# Patient Record
Sex: FEMALE | Race: BLACK OR AFRICAN AMERICAN | NOT HISPANIC OR LATINO | Employment: FULL TIME | URBAN - METROPOLITAN AREA
[De-identification: names, ages, dates, MRNs, and addresses within clinical notes are randomized per-mention and may not be internally consistent; named-entity substitution may affect disease eponyms.]

---

## 2017-07-28 ENCOUNTER — CLINICAL SUPPORT (OUTPATIENT)
Dept: OCCUPATIONAL MEDICINE | Facility: CLINIC | Age: 57
End: 2017-07-28

## 2017-07-28 DIAGNOSIS — Z00.00 PHYSICAL EXAM: Primary | ICD-10-CM

## 2017-07-28 LAB
BILIRUB UR QL STRIP: NORMAL
GLUCOSE UR QL STRIP: NORMAL
KETONES UR QL STRIP: NORMAL
LEUKOCYTE ESTERASE UR QL STRIP: NORMAL
PH, POC UA: NORMAL (ref 5–8)
POC BLOOD, URINE: NORMAL
POC NITRATES, URINE: NORMAL
PROT UR QL STRIP: NORMAL
SP GR UR STRIP: NORMAL (ref 1–1.03)
UROBILINOGEN UR STRIP-ACNC: NORMAL (ref 0.1–1.1)

## 2017-07-28 PROCEDURE — 99499 UNLISTED E&M SERVICE: CPT | Mod: S$GLB,,, | Performed by: NURSE PRACTITIONER

## 2018-03-29 ENCOUNTER — TELEPHONE (OUTPATIENT)
Dept: FAMILY MEDICINE | Facility: CLINIC | Age: 58
End: 2018-03-29

## 2018-03-29 NOTE — TELEPHONE ENCOUNTER
----- Message from Myranda Crowe sent at 3/28/2018 11:02 AM CDT -----  Contact: self, [phone]  New patient requests to schedule an appointment. Please advise.

## 2018-04-16 ENCOUNTER — LAB VISIT (OUTPATIENT)
Dept: LAB | Facility: HOSPITAL | Age: 58
End: 2018-04-16
Attending: INTERNAL MEDICINE
Payer: COMMERCIAL

## 2018-04-16 ENCOUNTER — OFFICE VISIT (OUTPATIENT)
Dept: INTERNAL MEDICINE | Facility: CLINIC | Age: 58
End: 2018-04-16
Payer: COMMERCIAL

## 2018-04-16 VITALS
SYSTOLIC BLOOD PRESSURE: 130 MMHG | HEART RATE: 58 BPM | HEIGHT: 69 IN | WEIGHT: 206.56 LBS | BODY MASS INDEX: 30.59 KG/M2 | DIASTOLIC BLOOD PRESSURE: 80 MMHG

## 2018-04-16 DIAGNOSIS — Z12.4 CERVICAL CANCER SCREENING: ICD-10-CM

## 2018-04-16 DIAGNOSIS — Z12.11 SCREEN FOR COLON CANCER: ICD-10-CM

## 2018-04-16 DIAGNOSIS — Z00.00 PREVENTATIVE HEALTH CARE: Primary | ICD-10-CM

## 2018-04-16 DIAGNOSIS — Z11.59 NEED FOR HEPATITIS C SCREENING TEST: ICD-10-CM

## 2018-04-16 DIAGNOSIS — Z00.00 PREVENTATIVE HEALTH CARE: ICD-10-CM

## 2018-04-16 DIAGNOSIS — Z12.39 BREAST CANCER SCREENING: ICD-10-CM

## 2018-04-16 LAB
25(OH)D3+25(OH)D2 SERPL-MCNC: 17 NG/ML
ALBUMIN SERPL BCP-MCNC: 3.9 G/DL
ALP SERPL-CCNC: 93 U/L
ALT SERPL W/O P-5'-P-CCNC: 18 U/L
ANION GAP SERPL CALC-SCNC: 9 MMOL/L
AST SERPL-CCNC: 24 U/L
BASOPHILS # BLD AUTO: 0.03 K/UL
BASOPHILS NFR BLD: 0.6 %
BILIRUB SERPL-MCNC: 0.4 MG/DL
BUN SERPL-MCNC: 6 MG/DL
CALCIUM SERPL-MCNC: 9.8 MG/DL
CHLORIDE SERPL-SCNC: 106 MMOL/L
CHOLEST SERPL-MCNC: 216 MG/DL
CHOLEST/HDLC SERPL: 4.8 {RATIO}
CO2 SERPL-SCNC: 26 MMOL/L
CREAT SERPL-MCNC: 0.8 MG/DL
DIFFERENTIAL METHOD: NORMAL
EOSINOPHIL # BLD AUTO: 0.1 K/UL
EOSINOPHIL NFR BLD: 1.6 %
ERYTHROCYTE [DISTWIDTH] IN BLOOD BY AUTOMATED COUNT: 12.3 %
EST. GFR  (AFRICAN AMERICAN): >60 ML/MIN/1.73 M^2
EST. GFR  (NON AFRICAN AMERICAN): >60 ML/MIN/1.73 M^2
GLUCOSE SERPL-MCNC: 82 MG/DL
HCT VFR BLD AUTO: 39.1 %
HDLC SERPL-MCNC: 45 MG/DL
HDLC SERPL: 20.8 %
HGB BLD-MCNC: 13.2 G/DL
IMM GRANULOCYTES # BLD AUTO: 0.01 K/UL
IMM GRANULOCYTES NFR BLD AUTO: 0.2 %
LDLC SERPL CALC-MCNC: 144.8 MG/DL
LYMPHOCYTES # BLD AUTO: 1.5 K/UL
LYMPHOCYTES NFR BLD: 31.2 %
MCH RBC QN AUTO: 30.3 PG
MCHC RBC AUTO-ENTMCNC: 33.8 G/DL
MCV RBC AUTO: 90 FL
MONOCYTES # BLD AUTO: 0.3 K/UL
MONOCYTES NFR BLD: 6.6 %
NEUTROPHILS # BLD AUTO: 2.9 K/UL
NEUTROPHILS NFR BLD: 59.8 %
NONHDLC SERPL-MCNC: 171 MG/DL
NRBC BLD-RTO: 0 /100 WBC
PLATELET # BLD AUTO: 318 K/UL
PMV BLD AUTO: 10.3 FL
POTASSIUM SERPL-SCNC: 3.7 MMOL/L
PROT SERPL-MCNC: 8.6 G/DL
RBC # BLD AUTO: 4.36 M/UL
SODIUM SERPL-SCNC: 141 MMOL/L
T4 FREE SERPL-MCNC: 1.15 NG/DL
TRIGL SERPL-MCNC: 131 MG/DL
TSH SERPL DL<=0.005 MIU/L-ACNC: 5.16 UIU/ML
WBC # BLD AUTO: 4.87 K/UL

## 2018-04-16 PROCEDURE — 84443 ASSAY THYROID STIM HORMONE: CPT

## 2018-04-16 PROCEDURE — 82306 VITAMIN D 25 HYDROXY: CPT

## 2018-04-16 PROCEDURE — 85025 COMPLETE CBC W/AUTO DIFF WBC: CPT

## 2018-04-16 PROCEDURE — 86803 HEPATITIS C AB TEST: CPT

## 2018-04-16 PROCEDURE — 99386 PREV VISIT NEW AGE 40-64: CPT | Mod: S$GLB,,, | Performed by: INTERNAL MEDICINE

## 2018-04-16 PROCEDURE — 84439 ASSAY OF FREE THYROXINE: CPT

## 2018-04-16 PROCEDURE — 80061 LIPID PANEL: CPT

## 2018-04-16 PROCEDURE — 99999 PR PBB SHADOW E&M-EST. PATIENT-LVL IV: CPT | Mod: PBBFAC,,, | Performed by: INTERNAL MEDICINE

## 2018-04-16 PROCEDURE — 87624 HPV HI-RISK TYP POOLED RSLT: CPT

## 2018-04-16 PROCEDURE — 36415 COLL VENOUS BLD VENIPUNCTURE: CPT | Mod: PO

## 2018-04-16 PROCEDURE — 88175 CYTOPATH C/V AUTO FLUID REDO: CPT

## 2018-04-16 PROCEDURE — 80053 COMPREHEN METABOLIC PANEL: CPT

## 2018-04-16 NOTE — PROGRESS NOTES
Subjective:       Patient ID: Aminta Barajas is a 58 y.o. female.    Chief Complaint: Annual Exam    HPI 58-year-old female presents to clinic today for annual physical exam patient is here to get established she is a new patient.  Patient has not seen a physician in more than 2 years.  She states she's been in good health and was previously followed by her OB/GYN.  She states it's been a long time since she had a female wellness exam and is willing to proceed in all of this today.  Denies any menses in the last 4 years.  Patient reports that her blood pressure machine at home as been measuring her blood pressure typically in the /60 to 70s range.  She thought that it is may be running on the low side..  Patient jogs and exercises regularly at least 3-4 times per week.  Patient has no history of hypertension diabetes or dyslipidemia.  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  She is  to her patient Mr. Barajas, she has 5 children ages 18-39.  Former history of being in the .  Also as a school athlete.  Denies tobacco or alcohol use  Review of Systems  otherwise negative  Objective:     vital signs reviewed.  Patient's home monitor definitely not accurate registering more than 40 points systolic difference from manual and office blood pressure  Physical Exam  General: Well-appearing, well-nourished.  No distress  HEENT: conjunctivae are normal.  Pupils are equal and reative to light.  TM's are clear and intact bilaterally.  Hearing is grossly normal.  Nasopharynx is clear.  Oropharynx is clear.  Neck: Supple.  No thyroid megaly.  No bruits.  Lymph: No cervical or supraclavicular adenopathy.  Heart: Regular rate and rhythm, without murmur, rub or gallop.  Lungs: Clear to auscultation; respiratory effort  normal.  Abdomen: Soft, nontender, nondistended.  Normoactive bowel sounds.  No hepatomegaly.  No masses.  Extremities: Good distal pulses.  No edema.  Musculoskeletal: 5/5 Strength bilateral upper and lower extremities; free range of motion.  Neuro: No focal deficits.  DTR's are 2+ and equal throughout.  Sensation intact. Normal gait.  Skin: No lesions seen.  Psychiatric: Oriented to time, person, place.  Judgment and insight seem unimpaired.  Pelvic: External genitalia are normal.  Cervix is normal.  Bimanual exam revealed no masses.  Breasts: No masses, discharge, or tenderness.    Assessment:       1. Preventative health care    2. Need for hepatitis C screening test    3. Breast cancer screening    4. Cervical cancer screening    5. Screen for colon cancer        Plan:       Aminat was seen today for annual exam.    Diagnoses and all orders for this visit:    Preventative health care  -     CBC auto differential; Future  -     Comprehensive metabolic panel; Future  -     Lipid panel; Future  -     TSH; Future  -     Vitamin D; Future  -     URINALYSIS    Need for hepatitis C screening test  -     Hepatitis C antibody; Future    Breast cancer screening  -     Mammo Digital Screening Bilat with CAD; Future    Cervical cancer screening  -     Liquid-based pap smear, screening  -     HPV High Risk Genotypes, PCR    Screen for colon cancer  -     Case request GI: COLONOSCOPY

## 2018-04-17 ENCOUNTER — PATIENT MESSAGE (OUTPATIENT)
Dept: INTERNAL MEDICINE | Facility: CLINIC | Age: 58
End: 2018-04-17

## 2018-04-17 LAB — HCV AB SERPL QL IA: NEGATIVE

## 2018-04-19 LAB
HPV16 AG SPEC QL: NEGATIVE
HPV16+18+H RISK 12 DNA CVX-IMP: NEGATIVE
HPV18 DNA SPEC QL NAA+PROBE: NEGATIVE

## 2018-04-26 ENCOUNTER — TELEPHONE (OUTPATIENT)
Dept: GASTROENTEROLOGY | Facility: CLINIC | Age: 58
End: 2018-04-26

## 2018-04-26 ENCOUNTER — PATIENT MESSAGE (OUTPATIENT)
Dept: GASTROENTEROLOGY | Facility: CLINIC | Age: 58
End: 2018-04-26

## 2018-04-26 DIAGNOSIS — Z12.11 SCREENING FOR COLON CANCER: Primary | ICD-10-CM

## 2018-04-26 RX ORDER — POLYETHYLENE GLYCOL 3350, SODIUM SULFATE ANHYDROUS, SODIUM BICARBONATE, SODIUM CHLORIDE, POTASSIUM CHLORIDE 236; 22.74; 6.74; 5.86; 2.97 G/4L; G/4L; G/4L; G/4L; G/4L
4 POWDER, FOR SOLUTION ORAL ONCE
Qty: 4000 ML | Refills: 0 | Status: SHIPPED | OUTPATIENT
Start: 2018-04-26 | End: 2018-04-26

## 2018-04-26 NOTE — TELEPHONE ENCOUNTER
Colonoscopy Referral    Referring Physician: Dr. Grijalva       Date: 06/30/2018    Reason for Referral: Screening Colon     Family History of: None   Colon polyp:  Relationship/Age of Onset:     Colon cancer:   Relationship/Age of Onset:     Patient with:   Hemoccults Done: no     Iron deficient: yes    On Blood Thinner: no     Valvular heart disease/valve replacement: no     Anemia Present: no    On NSAID: no     Lung disease: no     Kidney disease: no     Hx of polyps:     Hx of colon cancer:     Previous colon evalations:  None   When:   Where:   Pertinent symptoms:       Review of patient's allergies indicates:  Darvocet     Patient was scheduled for colonoscopy on 06/30/2018 with Dr. Reyes at [facility]. Golytely  instructions were reviewed with patient.

## 2018-04-30 ENCOUNTER — HOSPITAL ENCOUNTER (OUTPATIENT)
Dept: RADIOLOGY | Facility: HOSPITAL | Age: 58
Discharge: HOME | End: 2018-04-30
Attending: INTERNAL MEDICINE
Payer: COMMERCIAL

## 2018-04-30 ENCOUNTER — PATIENT MESSAGE (OUTPATIENT)
Dept: INTERNAL MEDICINE | Facility: CLINIC | Age: 58
End: 2018-04-30

## 2018-04-30 DIAGNOSIS — Z12.39 BREAST CANCER SCREENING: ICD-10-CM

## 2018-04-30 PROCEDURE — 77067 SCR MAMMO BI INCL CAD: CPT | Mod: TC,PO

## 2018-04-30 PROCEDURE — 77067 SCR MAMMO BI INCL CAD: CPT | Mod: 26,,, | Performed by: RADIOLOGY

## 2018-05-30 ENCOUNTER — HOSPITAL ENCOUNTER (OUTPATIENT)
Facility: HOSPITAL | Age: 58
Discharge: HOME | End: 2018-05-30
Attending: INTERNAL MEDICINE | Admitting: INTERNAL MEDICINE
Payer: COMMERCIAL

## 2018-05-30 ENCOUNTER — SURGERY (OUTPATIENT)
Age: 58
End: 2018-05-30

## 2018-05-30 ENCOUNTER — ANESTHESIA EVENT (OUTPATIENT)
Dept: ENDOSCOPY | Facility: HOSPITAL | Age: 58
End: 2018-05-30
Payer: COMMERCIAL

## 2018-05-30 ENCOUNTER — ANESTHESIA (OUTPATIENT)
Dept: ENDOSCOPY | Facility: HOSPITAL | Age: 58
End: 2018-05-30
Payer: COMMERCIAL

## 2018-05-30 DIAGNOSIS — Z12.11 SCREENING FOR COLON CANCER: ICD-10-CM

## 2018-05-30 PROCEDURE — 37000008 HC ANESTHESIA 1ST 15 MINUTES: Performed by: INTERNAL MEDICINE

## 2018-05-30 PROCEDURE — G0121 COLON CA SCRN NOT HI RSK IND: HCPCS | Performed by: INTERNAL MEDICINE

## 2018-05-30 PROCEDURE — 37000009 HC ANESTHESIA EA ADD 15 MINS: Performed by: INTERNAL MEDICINE

## 2018-05-30 PROCEDURE — 63600175 PHARM REV CODE 636 W HCPCS: Performed by: NURSE ANESTHETIST, CERTIFIED REGISTERED

## 2018-05-30 PROCEDURE — G0121 COLON CA SCRN NOT HI RSK IND: HCPCS | Mod: ,,, | Performed by: INTERNAL MEDICINE

## 2018-05-30 RX ORDER — LIDOCAINE HCL/PF 100 MG/5ML
SYRINGE (ML) INTRAVENOUS
Status: DISCONTINUED | OUTPATIENT
Start: 2018-05-30 | End: 2018-05-30

## 2018-05-30 RX ORDER — PROPOFOL 10 MG/ML
VIAL (ML) INTRAVENOUS CONTINUOUS PRN
Status: DISCONTINUED | OUTPATIENT
Start: 2018-05-30 | End: 2018-05-30

## 2018-05-30 RX ORDER — SODIUM CHLORIDE 9 MG/ML
INJECTION, SOLUTION INTRAVENOUS CONTINUOUS
Status: DISCONTINUED | OUTPATIENT
Start: 2018-05-30 | End: 2018-05-30 | Stop reason: HOSPADM

## 2018-05-30 RX ORDER — LIDOCAINE HYDROCHLORIDE 10 MG/ML
1 INJECTION, SOLUTION EPIDURAL; INFILTRATION; INTRACAUDAL; PERINEURAL ONCE
Status: DISCONTINUED | OUTPATIENT
Start: 2018-05-30 | End: 2018-05-30 | Stop reason: HOSPADM

## 2018-05-30 RX ORDER — PROPOFOL 10 MG/ML
VIAL (ML) INTRAVENOUS
Status: DISCONTINUED | OUTPATIENT
Start: 2018-05-30 | End: 2018-05-30

## 2018-05-30 RX ADMIN — LIDOCAINE HYDROCHLORIDE 100 MG: 20 INJECTION, SOLUTION INTRAVENOUS at 11:05

## 2018-05-30 RX ADMIN — PROPOFOL 150 MCG/KG/MIN: 10 INJECTION, EMULSION INTRAVENOUS at 11:05

## 2018-05-30 RX ADMIN — PROPOFOL 50 MG: 10 INJECTION, EMULSION INTRAVENOUS at 11:05

## 2018-05-30 NOTE — [employer] PATIENT INSTRUCTIONS
Discharge Summary/Instructions after an Endoscopic Procedure  Patient Name: Aminta Barajas  Patient MRN: [id number]  Patient YOB: 1960  Wednesday, May 30, 2018  Nicolle Reyes MD  RESTRICTIONS:  During your procedure today, you received medications for sedation.  These   medications may affect your judgment, balance and coordination.  Therefore,   for 24 hours, you have the following restrictions:   - DO NOT drive a car, operate machinery, make legal/financial decisions,   sign important papers or drink alcohol.    ACTIVITY:  The following day: return to full activity including work, except no heavy   lifting, straining or running for 3 days if polyps were removed.  DIET:  Eat and drink normally unless instructed otherwise.     TREATMENT FOR COMMON SIDE EFFECTS:  - Mild abdominal pain, nausea, belching, bloating or excessive gas:  rest,   eat lightly and use a heating pad.  - Sore Throat: treat with throat lozenges and/or gargle with warm salt   water.  - Because air was used during the procedure, expelling large amounts of air   from your rectum or belching is normal.  - If a bowel prep was taken, you may not have a bowel movement for 1-3 days.    This is normal.  SYMPTOMS TO WATCH FOR AND REPORT TO YOUR PHYSICIAN:  1. Abdominal pain or bloating, other than gas cramps.  2. Chest pain.  3. Back pain.  4. Signs of infection such as: chills or fever occurring within 24 hours   after the procedure.  5. Rectal bleeding, which would show as bright red, maroon, or black stools.   (A tablespoon of blood from the rectum is not serious, especially if   hemorrhoids are present.)  6. Vomiting.  7. Weakness or dizziness.  GO DIRECTLY TO THE NEAREST EMERGENCY ROOM IF YOU HAVE ANY OF THE FOLLOWING:      Difficulty breathing              Chills and/or fever over 101 F   Persistent vomiting and/or vomiting blood   Severe abdominal pain   Severe chest pain   Black, tarry stools   Bleeding- more than one  tablespoon   Any other symptom or condition that you feel may need urgent attention  Your doctor recommends these additional instructions:  If any biopsies were taken, your doctors clinic will contact you in 1 to 2   weeks with any results.  - Discharge patient to home.   - Repeat colonoscopy in 10 years for screening purposes.  For questions, problems or results please call your physician - Nicolle Reyes MD at Work:  [phone].  EMERGENCY PHONE NUMBER: [phone],  LAB RESULTS: [phone]  IF A COMPLICATION OR EMERGENCY SITUATION ARISES AND YOU ARE UNABLE TO REACH   YOUR PHYSICIAN - GO DIRECTLY TO THE EMERGENCY ROOM.  Nicolle Reyes MD  5/30/2018 11:38:58 AM  This report has been verified and signed electronically.  [employer]

## 2018-05-30 NOTE — ANESTHESIA PREPROCEDURE EVALUATION
05/30/2018  Aminta Barajas is a 58 y.o., female.    Anesthesia Evaluation      I have reviewed the Medications.     Review of Systems  Anesthesia Hx:  No problems with previous Anesthesia Denies Hx of Anesthetic complications History of prior surgery of interest to airway management or planning: Previous anesthesia: General Denies Family Hx of Anesthesia complications.   Denies Personal Hx of Anesthesia complications.   Social:  Non-Smoker, No Alcohol Use    Hematology/Oncology:  Hematology Normal   Oncology Normal     EENT/Dental:EENT/Dental Normal   Cardiovascular:  Cardiovascular Normal Exercise tolerance: good     Pulmonary:  Pulmonary Normal    Renal/:  Renal/ Normal     Hepatic/GI:  Hepatic/GI Normal    Musculoskeletal:  Musculoskeletal Normal    Neurological:  Neurology Normal    Endocrine:  Endocrine Normal    Dermatological:  Skin Normal    Psych:  Psychiatric Normal           Physical Exam  General:  Well nourished    Airway/Jaw/Neck:  Airway Findings: Mouth Opening: Normal Tongue: Normal  General Airway Assessment: Adult  Mallampati: II      Dental:  Dental Findings: In tact   Chest/Lungs:  Chest/Lungs Findings: Clear to auscultation, Normal Respiratory Rate     Heart/Vascular:  Heart Findings: Rate: Normal  Rhythm: Regular Rhythm        Mental Status:  Mental Status Findings:  Cooperative, Alert and Oriented         Anesthesia Plan  Type of Anesthesia, risks & benefits discussed:  Anesthesia Type:  MAC  Patient's Preference: MAC  Intra-op Monitoring Plan:   Intra-op Monitoring Plan Comments:   Post Op Pain Control Plan:   Post Op Pain Control Plan Comments:   Induction:   IV  Beta Blocker:         Informed Consent: Patient understands risks and agrees with Anesthesia plan.  Questions answered. Anesthesia consent signed with patient.  ASA Score: 2     Day of Surgery Review of History &  Physical:            Ready For Surgery From Anesthesia Perspective.

## 2018-05-30 NOTE — PLAN OF CARE
Past Medical History:   Diagnosis Date    Anemia      Normal colon exam today. Dr. Reyes visited at bedside prior tp discharge, discussed findings and recommendations with patient and spouse; all questions asked and answered. Verbalized understanding of information give. Handout provided at time of discharge.

## 2018-05-30 NOTE — H&P
History and Physical      Chief complaints: Requesting screening colonscopy    History of Presenting Illness    Patient requesting colonoscopy.  Patient denies any abdominal pain, weight loss or blood in the stool.  There is no family history of colon cancer.    Review of Systems   Constitutional: Negative for fever and appetite change.   HENT: Negative for sore throat, trouble swallowing and neck pain.   Eyes: Negative for photophobia and visual disturbance.   Respiratory: Negative for wheezing.   Cardiovascular: Negative for chest pain and palpitations.   Gastrointestinal: See HPI for details   Musculoskeletal: Negative for joint swelling and arthralgias.   Skin: Negative for rash and wound.   Neurological: Negative for dizziness, tremors and weakness.   Hematological: Negative.   Psychiatric/Behavioral: Negative for suicidal ideas and behavioral problems.     Past Medical History:   Diagnosis Date    Anemia        Past Surgical History:   Procedure Laterality Date     SECTION, CLASSIC      OOPHORECTOMY         Family History   Problem Relation Age of Onset    Breast cancer Maternal Aunt        Social History     Social History    Marital status:      Spouse name: N/A    Number of children: N/A    Years of education: N/A     Social History Main Topics    Smoking status: Never Smoker    Smokeless tobacco: None    Alcohol use No    Drug use: No    Sexual activity: Yes     Partners: Male     Other Topics Concern    None     Social History Narrative    None       Current Facility-Administered Medications   Medication Dose Route Frequency Provider Last Rate Last Dose    0.9%  NaCl infusion   Intravenous Continuous Nicolle Reyes MD        lidocaine (PF) 10 mg/ml (1%) injection 10 mg  1 mL Intradermal Once Nicolle Reyes MD           Review of patient's allergies indicates:   Allergen Reactions    Darvocet a500 [propoxyphene n-acetaminophen] Itching       Objective:       Vitals:    05/30/18 0908   BP: (!) 121/58   Pulse: 61   Resp: 20   Temp: 98.2 °F (36.8 °C)     Physical Exam   Constitutional: Patient is oriented to person, place, and time. Appears well-nourished.   HENT:   Mouth/Throat: Oropharynx is clear and moist.   Eyes: Pupils are equal, round, and reactive to light.   Neck: Neck supple.   Cardiovascular: Normal heart sounds.   Pulmonary/Chest: Effort normal and breath sounds normal.   Abdominal: Soft. Exhibits no mass. There is no tenderness. There is no guarding.   Musculoskeletal: Normal range of motion.   Lymphadenopathy: Has no cervical adenopathy.   Neurological:Alert and oriented to person, place, and time.   Skin: Skin is warm. No rash noted.   Psychiatric: Has a normal mood and affect.     Assessment:  Colon cancer screening    Plan:  Colonoscopy       I have reviewed the patient's medical history in detail and updated the computerized patient record

## 2018-05-30 NOTE — TRANSFER OF CARE
"Anesthesia Transfer of Care Note    Patient: Aminta Barajas    Procedure(s) Performed: Procedure(s) (LRB):  COLONOSCOPY (N/A)    Patient location: GI    Anesthesia Type: MAC    Transport from OR: Transported from OR on room air with adequate spontaneous ventilation    Post pain: adequate analgesia    Post assessment: no apparent anesthetic complications    Post vital signs: stable    Level of consciousness: awake    Nausea/Vomiting: no nausea/vomiting    Complications: none    Transfer of care protocol was followed      Last vitals:   Visit Vitals  BP (!) 121/58 (BP Location: Left arm, Patient Position: Sitting)   Pulse 61   Temp 36.8 °C (98.2 °F) (Oral)   Resp 20   Ht 5' 9" (1.753 m)   Wt 90.7 kg (200 lb)   LMP 02/13/2013   SpO2 100%   Breastfeeding? No   BMI 29.53 kg/m²     "

## 2018-05-30 NOTE — ANESTHESIA POSTPROCEDURE EVALUATION
"Anesthesia Post Evaluation    Patient: Aminta Barajas    Procedure(s) Performed: Procedure(s) (LRB):  COLONOSCOPY (N/A)    Final Anesthesia Type: MAC  Patient location during evaluation: GI PACU  Patient participation: Yes- Able to Participate  Level of consciousness: awake and alert  Post-procedure vital signs: reviewed and stable  Pain management: adequate  Airway patency: patent  PONV status at discharge: No PONV  Anesthetic complications: no      Cardiovascular status: blood pressure returned to baseline and hemodynamically stable  Respiratory status: unassisted, spontaneous ventilation and room air  Hydration status: euvolemic  Follow-up not needed.        Visit Vitals  BP (!) 121/58 (BP Location: Left arm, Patient Position: Sitting)   Pulse 61   Temp 36.8 °C (98.2 °F) (Oral)   Resp 20   Ht 5' 9" (1.753 m)   Wt 90.7 kg (200 lb)   LMP 02/13/2013   SpO2 100%   Breastfeeding? No   BMI 29.53 kg/m²       Pain/Preston Score: Pain Assessment Performed: Yes (5/30/2018  9:04 AM)  Presence of Pain: denies (5/30/2018  9:04 AM)      "

## 2018-05-31 VITALS
BODY MASS INDEX: 29.62 KG/M2 | SYSTOLIC BLOOD PRESSURE: 132 MMHG | DIASTOLIC BLOOD PRESSURE: 73 MMHG | OXYGEN SATURATION: 100 % | RESPIRATION RATE: 20 BRPM | HEART RATE: 53 BPM | HEIGHT: 69 IN | TEMPERATURE: 99 F | WEIGHT: 200 LBS

## 2018-07-02 ENCOUNTER — CLINICAL SUPPORT (OUTPATIENT)
Dept: OCCUPATIONAL MEDICINE | Facility: CLINIC | Age: 58
End: 2018-07-02

## 2018-07-02 DIAGNOSIS — Z00.00 ENCOUNTER FOR PHYSICAL EXAMINATION: ICD-10-CM

## 2018-07-02 PROCEDURE — 99499 UNLISTED E&M SERVICE: CPT | Mod: S$GLB,,, | Performed by: PREVENTIVE MEDICINE

## 2019-08-08 ENCOUNTER — CLINICAL SUPPORT (OUTPATIENT)
Dept: URGENT CARE | Facility: CLINIC | Age: 59
End: 2019-08-08

## 2019-08-08 DIAGNOSIS — Z02.89 ENCOUNTER FOR EXAMINATION REQUIRED BY DEPARTMENT OF TRANSPORTATION (DOT): Primary | ICD-10-CM

## 2019-08-08 PROCEDURE — 99499 UNLISTED E&M SERVICE: CPT | Mod: S$GLB,,, | Performed by: NURSE PRACTITIONER

## 2019-08-08 PROCEDURE — 99499 PHYSICAL, RECERT DOT/CDL: ICD-10-PCS | Mod: S$GLB,,, | Performed by: NURSE PRACTITIONER

## 2020-06-23 ENCOUNTER — OCCUPATIONAL HEALTH (OUTPATIENT)
Dept: URGENT CARE | Facility: CLINIC | Age: 60
End: 2020-06-23

## 2020-06-23 DIAGNOSIS — Z02.89 ENCOUNTER FOR EXAMINATION REQUIRED BY DEPARTMENT OF TRANSPORTATION (DOT): Primary | ICD-10-CM

## 2020-06-23 PROCEDURE — 99499 UNLISTED E&M SERVICE: CPT | Mod: S$GLB,,, | Performed by: NURSE PRACTITIONER

## 2020-06-23 PROCEDURE — 99499 PHYSICAL, RECERT DOT/CDL: ICD-10-PCS | Mod: S$GLB,,, | Performed by: NURSE PRACTITIONER

## 2020-07-09 ENCOUNTER — OFFICE VISIT (OUTPATIENT)
Dept: INTERNAL MEDICINE | Facility: CLINIC | Age: 60
End: 2020-07-09
Payer: COMMERCIAL

## 2020-07-09 ENCOUNTER — TELEPHONE (OUTPATIENT)
Dept: INTERNAL MEDICINE | Facility: CLINIC | Age: 60
End: 2020-07-09

## 2020-07-09 VITALS
HEART RATE: 55 BPM | BODY MASS INDEX: 30.14 KG/M2 | TEMPERATURE: 97 F | OXYGEN SATURATION: 98 % | WEIGHT: 203.5 LBS | SYSTOLIC BLOOD PRESSURE: 140 MMHG | HEIGHT: 69 IN | DIASTOLIC BLOOD PRESSURE: 70 MMHG

## 2020-07-09 DIAGNOSIS — S46.011A TRAUMATIC COMPLETE TEAR OF RIGHT ROTATOR CUFF, INITIAL ENCOUNTER: Primary | ICD-10-CM

## 2020-07-09 DIAGNOSIS — M25.519 SHOULDER PAIN, UNSPECIFIED CHRONICITY, UNSPECIFIED LATERALITY: ICD-10-CM

## 2020-07-09 PROCEDURE — 96372 THER/PROPH/DIAG INJ SC/IM: CPT | Mod: S$GLB,,, | Performed by: INTERNAL MEDICINE

## 2020-07-09 PROCEDURE — 99214 OFFICE O/P EST MOD 30 MIN: CPT | Mod: 25,S$GLB,, | Performed by: INTERNAL MEDICINE

## 2020-07-09 PROCEDURE — 96372 PR INJECTION,THERAP/PROPH/DIAG2ST, IM OR SUBCUT: ICD-10-PCS | Mod: S$GLB,,, | Performed by: INTERNAL MEDICINE

## 2020-07-09 PROCEDURE — 99999 PR PBB SHADOW E&M-EST. PATIENT-LVL IV: CPT | Mod: PBBFAC,,, | Performed by: INTERNAL MEDICINE

## 2020-07-09 PROCEDURE — 3008F PR BODY MASS INDEX (BMI) DOCUMENTED: ICD-10-PCS | Mod: CPTII,S$GLB,, | Performed by: INTERNAL MEDICINE

## 2020-07-09 PROCEDURE — 3008F BODY MASS INDEX DOCD: CPT | Mod: CPTII,S$GLB,, | Performed by: INTERNAL MEDICINE

## 2020-07-09 PROCEDURE — 99999 PR PBB SHADOW E&M-EST. PATIENT-LVL IV: ICD-10-PCS | Mod: PBBFAC,,, | Performed by: INTERNAL MEDICINE

## 2020-07-09 PROCEDURE — 99214 PR OFFICE/OUTPT VISIT, EST, LEVL IV, 30-39 MIN: ICD-10-PCS | Mod: 25,S$GLB,, | Performed by: INTERNAL MEDICINE

## 2020-07-09 RX ORDER — KETOROLAC TROMETHAMINE 30 MG/ML
60 INJECTION, SOLUTION INTRAMUSCULAR; INTRAVENOUS
Status: COMPLETED | OUTPATIENT
Start: 2020-07-09 | End: 2020-07-09

## 2020-07-09 RX ORDER — KETOROLAC TROMETHAMINE 10 MG/1
10 TABLET, FILM COATED ORAL EVERY 6 HOURS PRN
Qty: 28 TABLET | Refills: 0 | Status: SHIPPED | OUTPATIENT
Start: 2020-07-10 | End: 2020-08-14

## 2020-07-09 RX ORDER — PREDNISONE 20 MG/1
TABLET ORAL
Qty: 20 TABLET | Refills: 0 | Status: SHIPPED | OUTPATIENT
Start: 2020-07-09 | End: 2020-08-14

## 2020-07-09 RX ORDER — KETOROLAC TROMETHAMINE 30 MG/ML
60 INJECTION, SOLUTION INTRAMUSCULAR; INTRAVENOUS
Status: DISCONTINUED | OUTPATIENT
Start: 2020-07-09 | End: 2020-07-09

## 2020-07-09 RX ADMIN — KETOROLAC TROMETHAMINE 60 MG: 30 INJECTION, SOLUTION INTRAMUSCULAR; INTRAVENOUS at 10:07

## 2020-07-09 NOTE — TELEPHONE ENCOUNTER
----- Message from Magda Patel sent at 7/9/2020  1:49 PM CDT -----  Contact: Nncutpt-[phone]  Type:  Needs Medical Advice    Who Called: PT  Reason for Call: pt would like orders for a MRI sent to [facility] in [place] at [phone],pt states Gissel Flynn is to high for the MRI  Would the patient rather a call back or a response via [facility]? Call back  Best Call Back Number: [phone]  Additional Information: n/a

## 2020-07-09 NOTE — TELEPHONE ENCOUNTER
Spoke with patient regarding message left. Pt states that she would like for her MRI to go to an external imaging site due to it being financially cheaper. Faxed external MRI order, per pt's request, to [facility] [address] [place] [address].   Phone Number: [phone]   Fax Number: [phone]

## 2020-07-09 NOTE — PROGRESS NOTES
" Patient ID: Aminta Barajas is a 60 y.o. female.    Chief Complaint: Shoulder Pain (Right arm)    HPI Aminta is a 60 y.o. female who presents today with chief complaint of pain.  She is a patient of Dr. Willa Grijalva and today is her first visit with me.   Onset of pain was 2 days ago.  Pain is located in the right shoulder.  Patient states she was not doing anything in particular other than maybe some stretches for exercise at home.  She denies lifting any heavy weights.  Pain is rated "20/10" in severity. It is associated with symptom of inability to lift her arm.  She has tried taking Aleve and aspirin, but with no relief of her pain.  Pain is constant in duration.  Nothing is making the pain better at this time.    Review of Systems   Musculoskeletal: Positive for arthralgias (right shoulder pain).   All other systems reviewed and are negative.        Objective:     Vitals:    07/09/20 1006   BP: (!) 140/70   Pulse: (!) 55   Temp: 97 °F (36.1 °C)        Physical Exam  Vitals signs reviewed.   Constitutional:       General: She is in acute distress.      Appearance: Normal appearance. She is normal weight. She is not ill-appearing or diaphoretic.   HENT:      Head: Normocephalic and atraumatic.      Right Ear: External ear normal.      Left Ear: External ear normal.      Nose: Nose normal.   Eyes:      Extraocular Movements: Extraocular movements intact.      Conjunctiva/sclera: Conjunctivae normal.   Musculoskeletal:      Comments: Patient holding right arm at side. She is unable to abduct the arm actively. Passive attempts to abduct  the arm cause worsening pain. She is able to make a fist and extend with elbow with no issues.    Skin:     General: Skin is warm and dry.   Neurological:      General: No focal deficit present.      Mental Status: She is oriented to person, place, and time. Mental status is at baseline.   Psychiatric:         Mood and Affect: Mood normal.         Behavior: Behavior normal.    "      Thought Content: Thought content normal.         Judgment: Judgment normal.         Assessment:       1. Traumatic complete tear of right rotator cuff, initial encounter Active       Plan:     Given severity of pain and inability to actively move the arm, strongly suspect rotator cuff tear. Will obtain MRI shoulder. Patient was given toradol injection in clinic and sent home with prescriptions for toradol PO (which she can start tomorrow) and prednisone for pain relief. Offered medication such as percocet but she is reluctant to try narcotic type medication as she had an adverse reaction to darvocet. If the prescribed medications do not provide relief, she will let me know and we could consider trial of tramadol. She will see orthopedics later today.    Traumatic complete tear of right rotator cuff, initial encounter  -     MRI Shoulder Without Contrast Right; Future; Expected date: 07/09/2020  -     Discontinue: ketorolac injection 60 mg  -     ketorolac (TORADOL) 10 mg tablet; Take 1 tablet (10 mg total) by mouth every 6 (six) hours as needed for Pain.  Dispense: 28 tablet; Refill: 0  -     predniSONE (DELTASONE) 20 MG tablet; Take 3 pills daily for 3 days, then 2 pills daily for 3 days, then 1 pill daily for 3 days, then 1/2 pill daily for 4 days.  Dispense: 20 tablet; Refill: 0  -     Ambulatory referral/consult to Orthopedics; Future; Expected date: 07/16/2020        RTC PRN    Warning signs discussed, patient to call with any further issues or worsening of symptoms.       Parts of the above note were dictated using a voice dictation software. Please excuse any grammatical or typographical errors.

## 2020-07-10 ENCOUNTER — TELEPHONE (OUTPATIENT)
Dept: INTERNAL MEDICINE | Facility: CLINIC | Age: 60
End: 2020-07-10

## 2020-07-10 NOTE — TELEPHONE ENCOUNTER
Schedule patient physical as she is overure since 2018 and behind on mammo.  Please contact patient.

## 2020-07-13 ENCOUNTER — TELEPHONE (OUTPATIENT)
Dept: SPORTS MEDICINE | Facility: CLINIC | Age: 60
End: 2020-07-13

## 2020-07-13 ENCOUNTER — TELEPHONE (OUTPATIENT)
Dept: INTERNAL MEDICINE | Facility: CLINIC | Age: 60
End: 2020-07-13

## 2020-07-13 DIAGNOSIS — S46.011A TRAUMATIC COMPLETE TEAR OF RIGHT ROTATOR CUFF, INITIAL ENCOUNTER: Primary | ICD-10-CM

## 2020-07-13 RX ORDER — TRAMADOL HYDROCHLORIDE 50 MG/1
50 TABLET ORAL EVERY 6 HOURS PRN
Qty: 28 TABLET | Refills: 0 | Status: SHIPPED | OUTPATIENT
Start: 2020-07-13 | End: 2020-07-20

## 2020-07-13 NOTE — TELEPHONE ENCOUNTER
Called pt to see if she completed her MRI.    She has not, claims [facility].    She will check with them one more time and call me back.        - Charles    Ext:[phone]

## 2020-07-13 NOTE — TELEPHONE ENCOUNTER
Patient with refractory, severe pain with traumatic rotator cuff tear.  Saw by colleague this week and was told to call her back if pain was refractory.  Patient did just that and this colleague routed message to me.  My Last appt with patient greater than 2 years ago but she is suffering with acute pain.  Ortho appt on 7/20.  Offer mild narcotic, tramadol.  Reviewed allergies with patient due to darvocet with a history of some itching. Patient would like to try tramadol and will monitor for any side effects.  Patient does have a physical scheduled for August with me again since she has been seen in over 2 years.  Medical decision making today was made based on colleagues recent medical office visit, imaging confirming complete rotator cuff tear and patient's history today of persistent refractory pain 10/10.

## 2020-07-13 NOTE — TELEPHONE ENCOUNTER
Pt having MRI of shoulder on 7/13/20 @ 9:30am at external facility.    She was informed that she should bring both the MRI report and disc to her appointment with Jose Barrera on 7/20/20 @3:30pm.

## 2020-07-13 NOTE — TELEPHONE ENCOUNTER
----- Message from Katina Villatoro MD sent at 7/12/2020  2:36 PM CDT -----  Contact: patient    ----- Message -----  From: Barbie Disla  Sent: 7/11/2020   1:57 PM CDT  To: [facility] Staff    Patient called and said was told by you that if the medication strength was not enough to call you and you can order a higher doses for.     Patient said she is still in a lot of pain and she would need higher doses   The one she has now is ketorolac (TORADOL) 10 mg tablet    [id number] - [place], [place] - [address] [phone]    Patient would like a call to know when you did the order for her   Patient can be reached at [phone]

## 2020-07-20 ENCOUNTER — HOSPITAL ENCOUNTER (OUTPATIENT)
Dept: RADIOLOGY | Facility: HOSPITAL | Age: 60
Discharge: HOME | End: 2020-07-20
Attending: PHYSICIAN ASSISTANT
Payer: COMMERCIAL

## 2020-07-20 ENCOUNTER — OFFICE VISIT (OUTPATIENT)
Dept: SPORTS MEDICINE | Facility: CLINIC | Age: 60
End: 2020-07-20
Payer: COMMERCIAL

## 2020-07-20 VITALS
HEART RATE: 82 BPM | DIASTOLIC BLOOD PRESSURE: 69 MMHG | WEIGHT: 203 LBS | HEIGHT: 69 IN | BODY MASS INDEX: 30.07 KG/M2 | SYSTOLIC BLOOD PRESSURE: 102 MMHG

## 2020-07-20 DIAGNOSIS — M75.31 CALCIFIC TENDINITIS OF RIGHT SHOULDER: ICD-10-CM

## 2020-07-20 DIAGNOSIS — M19.011 ARTHRITIS OF RIGHT ACROMIOCLAVICULAR JOINT: ICD-10-CM

## 2020-07-20 DIAGNOSIS — M25.511 RIGHT SHOULDER PAIN, UNSPECIFIED CHRONICITY: ICD-10-CM

## 2020-07-20 DIAGNOSIS — M25.511 RIGHT SHOULDER PAIN, UNSPECIFIED CHRONICITY: Primary | ICD-10-CM

## 2020-07-20 PROCEDURE — 73030 XR SHOULDER COMPLETE 2 OR MORE VIEWS RIGHT: ICD-10-PCS | Mod: 26,RT,, | Performed by: RADIOLOGY

## 2020-07-20 PROCEDURE — 73030 X-RAY EXAM OF SHOULDER: CPT | Mod: TC,RT

## 2020-07-20 PROCEDURE — 3008F PR BODY MASS INDEX (BMI) DOCUMENTED: ICD-10-PCS | Mod: CPTII,S$GLB,, | Performed by: PHYSICIAN ASSISTANT

## 2020-07-20 PROCEDURE — 20610 DRAIN/INJ JOINT/BURSA W/O US: CPT | Mod: RT,S$GLB,, | Performed by: PHYSICIAN ASSISTANT

## 2020-07-20 PROCEDURE — 99203 PR OFFICE/OUTPT VISIT, NEW, LEVL III, 30-44 MIN: ICD-10-PCS | Mod: 25,S$GLB,, | Performed by: PHYSICIAN ASSISTANT

## 2020-07-20 PROCEDURE — 73030 X-RAY EXAM OF SHOULDER: CPT | Mod: 26,RT,, | Performed by: RADIOLOGY

## 2020-07-20 PROCEDURE — 99999 PR PBB SHADOW E&M-EST. PATIENT-LVL IV: ICD-10-PCS | Mod: PBBFAC,,, | Performed by: PHYSICIAN ASSISTANT

## 2020-07-20 PROCEDURE — 20610 LARGE JOINT ASPIRATION/INJECTION: R SUBACROMIAL BURSA: ICD-10-PCS | Mod: RT,S$GLB,, | Performed by: PHYSICIAN ASSISTANT

## 2020-07-20 PROCEDURE — 99203 OFFICE O/P NEW LOW 30 MIN: CPT | Mod: 25,S$GLB,, | Performed by: PHYSICIAN ASSISTANT

## 2020-07-20 PROCEDURE — 99999 PR PBB SHADOW E&M-EST. PATIENT-LVL IV: CPT | Mod: PBBFAC,,, | Performed by: PHYSICIAN ASSISTANT

## 2020-07-20 PROCEDURE — 3008F BODY MASS INDEX DOCD: CPT | Mod: CPTII,S$GLB,, | Performed by: PHYSICIAN ASSISTANT

## 2020-07-20 RX ORDER — MELOXICAM 15 MG/1
15 TABLET ORAL DAILY
Qty: 30 TABLET | Refills: 2 | Status: SHIPPED | OUTPATIENT
Start: 2020-07-20 | End: 2020-08-14

## 2020-07-20 RX ORDER — TRIAMCINOLONE ACETONIDE 40 MG/ML
40 INJECTION, SUSPENSION INTRA-ARTICULAR; INTRAMUSCULAR
Status: DISCONTINUED | OUTPATIENT
Start: 2020-07-20 | End: 2020-07-20 | Stop reason: HOSPADM

## 2020-07-20 RX ADMIN — TRIAMCINOLONE ACETONIDE 40 MG: 40 INJECTION, SUSPENSION INTRA-ARTICULAR; INTRAMUSCULAR at 03:07

## 2020-07-20 NOTE — PROGRESS NOTES
CC: RIGHT shoulder pain     60 y.o. Female with a history of right shoulder pain who presents for initial evaluation as a new patient to me.  Patient reports that approximately 2 weeks ago, she was exercising on her stationary bike when she began to feel dull aching sensation in her right shoulder.  This prompted her to stop activity, and she attempted to stretch and move her shoulder around to alleviate pain.  This resulted in severe right shoulder pain, that did not respond to conservative care at home.  She denies falling on her right shoulder, lifting anything heavy, or any other activity that she can contribute this pain to. Patient sought care her primary care physician's office, where she was given a Toradol injection, a prescription for a Medrol Dosepak, an external MRI was ordered, and was instructed to follow up with Orthopedics.  Patient has taken a short course of Toradol, as well as tramadol which she states made her feel lightheaded and dizzy, so she stopped taking.  Patient denies any associated numbness and tingling.  She states that her right arm has been extremely painful to move, and has had it in a sling for the past 2 weeks for comfort.    Patient states that this has happened before.  She is a , and states she has had similar symptoms and was told she had tendinitis.  She states that pain was present for approximately 2-3 weeks, but resolved on its own without treatment.    She reports that the pain and weakness is worse with overhead activity. It also bothers her at night.    Is affecting ADLs.  Pain is 10/10 at it's worst.      Past Medical History:   Diagnosis Date    Anemia        Past Surgical History:   Procedure Laterality Date     SECTION, CLASSIC      COLONOSCOPY N/A 2018    Procedure: COLONOSCOPY;  Surgeon: Nicolle Reyes MD;  Location: [facility];  Service: Endoscopy;  Laterality: N/A;    OOPHORECTOMY         Family History   Problem Relation  "Age of Onset    Breast cancer Maternal Aunt          Current Outpatient Medications:     ketorolac (TORADOL) 10 mg tablet, Take 1 tablet (10 mg total) by mouth every 6 (six) hours as needed for Pain., Disp: 28 tablet, Rfl: 0    predniSONE (DELTASONE) 20 MG tablet, Take 3 pills daily for 3 days, then 2 pills daily for 3 days, then 1 pill daily for 3 days, then 1/2 pill daily for 4 days., Disp: 20 tablet, Rfl: 0    traMADoL (ULTRAM) 50 mg tablet, Take 1 tablet (50 mg total) by mouth every 6 (six) hours as needed for Pain., Disp: 28 tablet, Rfl: 0    meloxicam (MOBIC) 15 MG tablet, Take 1 tablet (15 mg total) by mouth once daily., Disp: 30 tablet, Rfl: 2    Review of patient's allergies indicates:   Allergen Reactions    Darvocet a500 [propoxyphene n-acetaminophen] Itching          REVIEW OF SYSTEMS:  Constitution: Negative. Negative for chills, fever and night sweats.   HENT: Negative for congestion and headaches.    Eyes: Negative for blurred vision, left vision loss and right vision loss.   Cardiovascular: Negative for chest pain and syncope.   Respiratory: Negative for cough and shortness of breath.    Endocrine: Negative for polydipsia, polyphagia and polyuria.   Hematologic/Lymphatic: Negative for bleeding problem. Does not bruise/bleed easily.   Skin: Negative for dry skin, itching and rash.   Musculoskeletal: Negative for falls.  Positive for right shoulder pain and muscle weakness.   Gastrointestinal: Negative for abdominal pain and bowel incontinence.   Genitourinary: Negative for bladder incontinence and nocturia.   Neurological: Negative for disturbances in coordination, loss of balance and seizures.   Psychiatric/Behavioral: Negative for depression. The patient does not have insomnia.    Allergic/Immunologic: Negative for hives and persistent infections.      PHYSICAL EXAMINATION:  Vitals:  /69   Pulse 82   Ht 5' 9" (1.753 m)   Wt 92.1 kg (203 lb)   LMP 02/13/2013   BMI 29.98 kg/m²  "   General: The patient is alert and oriented x 3.  Mood is pleasant.  Observation of ears, eyes and nose reveal no gross abnormalities.  No labored breathing observed.  Gait is coordinated. Patient can toe walk and heel walk without difficulty.      RIGHT SHOULDER / UPPER EXTREMITY EXAM    OBSERVATION:     Swelling  none  Deformity  none   Discoloration  none   Scapular winging none   Scars   none  Atrophy  none    TENDERNESS / CREPITUS (T/C):          T/C      T/C   Clavicle   -/-  SUPRAspinatus    +/-     AC Jt.    -/-  INFRAspinatus  -/-    SC Jt.    -/-  Deltoid    -/-      G. Tuberosity  +/-  LH BICEP groove  -/-   Acromion:  -/-  Midline Neck   -/-     Scapular Spine -/-  Trapezium   -/-   SMA Scapula  -/-  GH jt. line - post  -/-     Scapulothoracic  -/-         ROM: (* = with pain)  Left shoulder   Right shoulder        AROM (PROM)   AROM (PROM)   FE    170° (175°)     100°* (160°*)     ER at 0°    60°  (65°)    50°*  (65°*)      IR (spine level)   T10     sacrum    STRENGTH: (* = with pain) Left shoulder   Right shoulder   SCAPTION   5/5    4+/5 (guarding)   IR    5/5    4+/5 (guarding)   ER    5/5    4+/5 (guarding)   BICEPS   5/5    5/5   Deltoid    5/5    4+/5 (guarding)     SIGNS:  Painful side       NEER   +    OROMELIAS  +    BONILLA   +    SPEEDS  neg     DROP ARM   +   BELLY PRESS neg   Superior escape none    LIFT-OFF  neg   X-Body ADD    neg    MOVING VALGUS neg        STABILITY TESTING    Left shoulder   Right shoulder    Translation     Anterior  up face     up face    Posterior  up face    up face    Sulcus   < 10mm    < 10 mm     Signs   Apprehension   neg      neg       Relocation   no change     no change      Jerk test  neg     neg    EXTREMITY NEURO-VASCULAR EXAM:    Sensation grossly intact to light touch all dermatomal regions.    DTR 2+ Biceps, Triceps, BR and Negative Laurents sign   Grossly intact motor function at Elbow, Wrist and Hand   Distal pulses radial and ulnar 2+,  brisk cap refill, symmetric.      NECK:  Painless FROM and spinous processes non-tender. Negative Spurlings sign.      OTHER FINDINGS:  N/A    XRAYS (07/20/2020):  FINDINGS:  Soft tissue calcifications likely calcific tendinitis.  Narrowing at the AC and glenohumeral joints with small osteophytes.  No subluxation.          ASSESSMENT:    Acute pain of right shoulder  Calcific tendinitis, right shoulder  AC joint arthritis    PLAN:      1. I made the decision to obtain old records of the patient including previous notes and imaging. New imaging was ordered today of the extremity or extremities evaluated. I independently reviewed and interpreted the radiographs and/or MRIs today as well as prior imaging.    2. Right shoulder subacromial CSI performed today in clinic.  See procedure note for details.      3. Mobic 15 mg 1 p.o. q.d. prescribed patient's pharmacy for checkup.  Instructed patient that she may alternate with Tylenol, but to avoid all other NSAIDs while taking Mobic.    4.  Ambulatory referral placed for formal physical therapy at [facility] [place] location.    5. HEP 57340 - Charles Alamo instructed and demonstrated a shoulder/rotator cuff HEP. The patient then demonstrated understanding of exercises and proper technique. This program was performed for 5 minutes.     6.  Instructed patient to discontinue sling and move her shoulder as much as pain allows.    7.  Follow up in 2-3 months for re-evaluation.          All questions were answered, patient will contact us for questions or concerns in the interim, or if her symptoms worsen or fail to improve.    Medical Dictation software was used during the dictation of portions or the entirety of this medical record.  Phonetic or grammatic errors may exist due to the use of this software. For clarification, refer to the author of the document.

## 2020-07-20 NOTE — PROCEDURES
Large Joint Aspiration/Injection: R subacromial bursa    Date/Time: 7/20/2020 3:30 PM  Performed by: Jose Barrera PA-C  Authorized by: Jose Barrrea PA-C     Consent Done?:  Yes (Verbal)  Indications:  Pain  Site marked: the procedure site was marked    Timeout: prior to procedure the correct patient, procedure, and site was verified    Prep: patient was prepped and draped in usual sterile fashion    Local anesthesia used?: No      Details:  Needle Size:  22 G  Ultrasonic Guidance for needle placement?: No    Approach:  Posterior  Location:  Shoulder  Site:  R subacromial bursa  Medications:  40 mg triamcinolone acetonide 40 mg/mL  Patient tolerance:  Patient tolerated the procedure well with no immediate complications    Injection Procedure  A time out was performed, including verification of patient ID, procedure, site and side, availability of information and equipment, review of safety issues, and agreement with consent, the procedure site was marked.    After time out was performed, the patient was prepped aseptically with povidone-iodine swabsticks. A diagnostic and therapeutic injection of 1:3cc Kenalog/Marcaine was given under sterile technique using a 22g x 1.5 needle from the Superolateral  aspect of the right subacromial space in the sitting position.      Aminta Barajas had no adverse reactions to the medication. Pain decreased. She was instructed to apply ice to the joint for 20 minutes and avoid strenuous activities for 24-36 hours following the injection. She was warned of possible blood sugar and/or blood pressure changes during that time. Following that time, she can resume regular activities.    She was reminded to call the clinic immediately for any adverse side effects as explained in clinic today.    Exp:  12/31/2021  Lot:  [id number]

## 2020-07-20 NOTE — LETTER
July 20, 2020      Katina Villatoro MD  [address] [facility]  [place] [address]           [address] [address]  Phone: [phone]          Patient: Aminta Barajas   MR Number: [id number]   YOB: 1960   Date of Visit: 7/20/2020       Dear Dr. Katina Villatoro:    Thank you for referring Aminta Barajas to me for evaluation. Attached you will find relevant portions of my assessment and plan of care.    If you have questions, please do not hesitate to call me. I look forward to following Aminta Barajas along with you.    Sincerely,    Jose Barrera PA-C    Enclosure  CC:  No Recipients    If you would like to receive this communication electronically, please contact [email or web address] or [phone] to request more information on [facility] Link access.    For providers and/or their staff who would like to refer a patient to [facility], please contact us through our one-stop-shop provider referral line, [facility], at [phone].    If you feel you have received this communication in error or would no longer like to receive these types of communications, please e-mail [email or web address]

## 2020-07-21 ENCOUNTER — TELEPHONE (OUTPATIENT)
Dept: INTERNAL MEDICINE | Facility: CLINIC | Age: 60
End: 2020-07-21

## 2020-07-21 NOTE — TELEPHONE ENCOUNTER
"Called patient regarding appointment with Dr. Abel for " Low b/p." Pt state that her b/p was around 85/50 and that she was experiencing dizziness and lightheadedness. Instructed pt to go to emergency department immediately. Pt verbalized understanding.  "

## 2020-07-27 PROBLEM — Z74.09 IMPAIRED MOBILITY AND ACTIVITIES OF DAILY LIVING: Status: ACTIVE | Noted: 2020-07-27

## 2020-07-27 PROBLEM — Z78.9 IMPAIRED MOBILITY AND ACTIVITIES OF DAILY LIVING: Status: ACTIVE | Noted: 2020-07-27

## 2020-08-13 DIAGNOSIS — Z00.00 PREVENTATIVE HEALTH CARE: Primary | ICD-10-CM

## 2020-08-14 ENCOUNTER — LAB VISIT (OUTPATIENT)
Dept: LAB | Facility: HOSPITAL | Age: 60
End: 2020-08-14
Attending: INTERNAL MEDICINE
Payer: COMMERCIAL

## 2020-08-14 ENCOUNTER — OFFICE VISIT (OUTPATIENT)
Dept: INTERNAL MEDICINE | Facility: CLINIC | Age: 60
End: 2020-08-14
Payer: COMMERCIAL

## 2020-08-14 VITALS
HEIGHT: 69 IN | DIASTOLIC BLOOD PRESSURE: 74 MMHG | TEMPERATURE: 99 F | OXYGEN SATURATION: 100 % | HEART RATE: 72 BPM | WEIGHT: 202.81 LBS | BODY MASS INDEX: 30.04 KG/M2 | SYSTOLIC BLOOD PRESSURE: 110 MMHG

## 2020-08-14 DIAGNOSIS — Z12.39 BREAST CANCER SCREENING: ICD-10-CM

## 2020-08-14 DIAGNOSIS — Z00.00 PREVENTATIVE HEALTH CARE: Primary | ICD-10-CM

## 2020-08-14 DIAGNOSIS — Z00.00 PREVENTATIVE HEALTH CARE: ICD-10-CM

## 2020-08-14 PROBLEM — Z12.11 SCREENING FOR COLON CANCER: Status: RESOLVED | Noted: 2018-05-30 | Resolved: 2020-08-14

## 2020-08-14 LAB
BASOPHILS # BLD AUTO: 0.04 K/UL (ref 0–0.2)
BASOPHILS NFR BLD: 0.6 % (ref 0–1.9)
DIFFERENTIAL METHOD: NORMAL
EOSINOPHIL # BLD AUTO: 0 K/UL (ref 0–0.5)
EOSINOPHIL NFR BLD: 0.3 % (ref 0–8)
ERYTHROCYTE [DISTWIDTH] IN BLOOD BY AUTOMATED COUNT: 12.8 % (ref 11.5–14.5)
HCT VFR BLD AUTO: 39.9 % (ref 37–48.5)
HGB BLD-MCNC: 12.8 G/DL (ref 12–16)
IMM GRANULOCYTES # BLD AUTO: 0.02 K/UL (ref 0–0.04)
IMM GRANULOCYTES NFR BLD AUTO: 0.3 % (ref 0–0.5)
LYMPHOCYTES # BLD AUTO: 1.5 K/UL (ref 1–4.8)
LYMPHOCYTES NFR BLD: 22.7 % (ref 18–48)
MCH RBC QN AUTO: 30.6 PG (ref 27–31)
MCHC RBC AUTO-ENTMCNC: 32.1 G/DL (ref 32–36)
MCV RBC AUTO: 96 FL (ref 82–98)
MONOCYTES # BLD AUTO: 0.4 K/UL (ref 0.3–1)
MONOCYTES NFR BLD: 6.3 % (ref 4–15)
NEUTROPHILS # BLD AUTO: 4.7 K/UL (ref 1.8–7.7)
NEUTROPHILS NFR BLD: 69.8 % (ref 38–73)
NRBC BLD-RTO: 0 /100 WBC
PLATELET # BLD AUTO: 307 K/UL (ref 150–350)
PMV BLD AUTO: 10.9 FL (ref 9.2–12.9)
RBC # BLD AUTO: 4.18 M/UL (ref 4–5.4)
WBC # BLD AUTO: 6.7 K/UL (ref 3.9–12.7)

## 2020-08-14 PROCEDURE — 99396 PREV VISIT EST AGE 40-64: CPT | Mod: S$GLB,,, | Performed by: INTERNAL MEDICINE

## 2020-08-14 PROCEDURE — 3008F BODY MASS INDEX DOCD: CPT | Mod: CPTII,S$GLB,, | Performed by: INTERNAL MEDICINE

## 2020-08-14 PROCEDURE — 84443 ASSAY THYROID STIM HORMONE: CPT

## 2020-08-14 PROCEDURE — 36415 COLL VENOUS BLD VENIPUNCTURE: CPT | Mod: PO

## 2020-08-14 PROCEDURE — 99999 PR PBB SHADOW E&M-EST. PATIENT-LVL III: ICD-10-PCS | Mod: PBBFAC,,, | Performed by: INTERNAL MEDICINE

## 2020-08-14 PROCEDURE — 99396 PR PREVENTIVE VISIT,EST,40-64: ICD-10-PCS | Mod: S$GLB,,, | Performed by: INTERNAL MEDICINE

## 2020-08-14 PROCEDURE — 80061 LIPID PANEL: CPT

## 2020-08-14 PROCEDURE — 99999 PR PBB SHADOW E&M-EST. PATIENT-LVL III: CPT | Mod: PBBFAC,,, | Performed by: INTERNAL MEDICINE

## 2020-08-14 PROCEDURE — 3008F PR BODY MASS INDEX (BMI) DOCUMENTED: ICD-10-PCS | Mod: CPTII,S$GLB,, | Performed by: INTERNAL MEDICINE

## 2020-08-14 PROCEDURE — 85025 COMPLETE CBC W/AUTO DIFF WBC: CPT

## 2020-08-14 PROCEDURE — 80053 COMPREHEN METABOLIC PANEL: CPT

## 2020-08-14 NOTE — PROGRESS NOTES
Subjective:       Patient ID: Aminta Barajas is a 60 y.o. female.    Chief Complaint: Annual Exam    HPI 60-year-old female presents to clinic today for annual physical exam.  She still doing physical therapy for rotator cuff tear.  She is without any other acute complaints or issues.  Review of Systems      Objective:      Physical Exam  General: Well-appearing, well-nourished.  No distress  HEENT: conjunctivae are normal.  Pupils are equal and reative to light.  TM's are clear and intact bilaterally.  Hearing is grossly normal.  Nasopharynx is clear.  Oropharynx is clear.  Neck: Supple.  No thyroid megaly.  No bruits.  Lymph: No cervical or supraclavicular adenopathy.  Heart: Regular rate and rhythm, without murmur, rub or gallop.  Lungs: Clear to auscultation; respiratory effort normal.  Abdomen: Soft, nontender, nondistended.  Normoactive bowel sounds.  No hepatomegaly.  No masses.  Extremities: Good distal pulses.  No edema.  Musculoskeletal: 5/5 Strength bilateral upper and lower extremities; free range of motion.  Neuro: No focal deficits.  DTR's are 2+ and equal throughout.  Sensation intact. Normal gait.  Skin: No lesions seen.  Psychiatric: Oriented to time, person, place.  Judgment and insight seem unimpaired.    Breasts: No masses, discharge, or tenderness.    Assessment:       1. Preventative health care    2. Breast cancer screening        Plan:       Aminta was seen today for annual exam.    Diagnoses and all orders for this visit:    Preventative health care  Performed reviewed and updated today  Breast cancer screening  -     Mammo Digital Screening Bilat w/ Carlos; Future

## 2020-08-15 LAB
ALBUMIN SERPL BCP-MCNC: 3.8 G/DL (ref 3.5–5.2)
ALP SERPL-CCNC: 89 U/L (ref 55–135)
ALT SERPL W/O P-5'-P-CCNC: 12 U/L (ref 10–44)
ANION GAP SERPL CALC-SCNC: 10 MMOL/L (ref 8–16)
AST SERPL-CCNC: 21 U/L (ref 10–40)
BILIRUB SERPL-MCNC: 0.5 MG/DL (ref 0.1–1)
BUN SERPL-MCNC: 6 MG/DL (ref 6–20)
CALCIUM SERPL-MCNC: 9.3 MG/DL (ref 8.7–10.5)
CHLORIDE SERPL-SCNC: 107 MMOL/L (ref 95–110)
CHOLEST SERPL-MCNC: 200 MG/DL (ref 120–199)
CHOLEST/HDLC SERPL: 3.6 {RATIO} (ref 2–5)
CO2 SERPL-SCNC: 24 MMOL/L (ref 23–29)
CREAT SERPL-MCNC: 0.8 MG/DL (ref 0.5–1.4)
EST. GFR  (AFRICAN AMERICAN): >60 ML/MIN/1.73 M^2
EST. GFR  (NON AFRICAN AMERICAN): >60 ML/MIN/1.73 M^2
GLUCOSE SERPL-MCNC: 57 MG/DL (ref 70–110)
HDLC SERPL-MCNC: 55 MG/DL (ref 40–75)
HDLC SERPL: 27.5 % (ref 20–50)
LDLC SERPL CALC-MCNC: 131.8 MG/DL (ref 63–159)
NONHDLC SERPL-MCNC: 145 MG/DL
POTASSIUM SERPL-SCNC: 3.5 MMOL/L (ref 3.5–5.1)
PROT SERPL-MCNC: 8 G/DL (ref 6–8.4)
SODIUM SERPL-SCNC: 141 MMOL/L (ref 136–145)
TRIGL SERPL-MCNC: 66 MG/DL (ref 30–150)
TSH SERPL DL<=0.005 MIU/L-ACNC: 2.39 UIU/ML (ref 0.4–4)

## 2020-09-11 ENCOUNTER — PATIENT OUTREACH (OUTPATIENT)
Dept: ADMINISTRATIVE | Facility: HOSPITAL | Age: 60
End: 2020-09-11

## 2020-10-05 ENCOUNTER — PATIENT MESSAGE (OUTPATIENT)
Dept: ADMINISTRATIVE | Facility: HOSPITAL | Age: 60
End: 2020-10-05

## 2020-10-19 ENCOUNTER — PATIENT OUTREACH (OUTPATIENT)
Dept: ADMINISTRATIVE | Facility: OTHER | Age: 60
End: 2020-10-19

## 2021-01-05 ENCOUNTER — PATIENT MESSAGE (OUTPATIENT)
Dept: ADMINISTRATIVE | Facility: HOSPITAL | Age: 61
End: 2021-01-05

## 2021-03-06 ENCOUNTER — IMMUNIZATION (OUTPATIENT)
Dept: FAMILY MEDICINE | Facility: CLINIC | Age: 61
End: 2021-03-06
Payer: COMMERCIAL

## 2021-03-06 DIAGNOSIS — Z23 NEED FOR VACCINATION: Primary | ICD-10-CM

## 2021-03-06 PROCEDURE — 0031A COVID-19,VECTOR-NR,RS-AD26,PF,0.5 ML DOSE VACCINE (JANSSEN): CPT | Mod: CV19,S$GLB,, | Performed by: FAMILY MEDICINE

## 2021-03-06 PROCEDURE — 91303 COVID-19,VECTOR-NR,RS-AD26,PF,0.5 ML DOSE VACCINE (JANSSEN): CPT | Mod: S$GLB,,, | Performed by: FAMILY MEDICINE

## 2021-03-06 PROCEDURE — 0031A COVID-19,VECTOR-NR,RS-AD26,PF,0.5 ML DOSE VACCINE (JANSSEN): ICD-10-PCS | Mod: CV19,S$GLB,, | Performed by: FAMILY MEDICINE

## 2021-03-06 PROCEDURE — 91303 COVID-19,VECTOR-NR,RS-AD26,PF,0.5 ML DOSE VACCINE (JANSSEN): ICD-10-PCS | Mod: S$GLB,,, | Performed by: FAMILY MEDICINE

## 2021-04-05 ENCOUNTER — PATIENT MESSAGE (OUTPATIENT)
Dept: ADMINISTRATIVE | Facility: HOSPITAL | Age: 61
End: 2021-04-05

## 2021-07-06 ENCOUNTER — PATIENT MESSAGE (OUTPATIENT)
Dept: ADMINISTRATIVE | Facility: HOSPITAL | Age: 61
End: 2021-07-06

## 2021-07-22 ENCOUNTER — OCCUPATIONAL HEALTH (OUTPATIENT)
Dept: URGENT CARE | Facility: CLINIC | Age: 61
End: 2021-07-22

## 2021-07-22 DIAGNOSIS — Z00.00 PE (PHYSICAL EXAM), ROUTINE: Primary | ICD-10-CM

## 2021-07-22 PROCEDURE — 99499 UNLISTED E&M SERVICE: CPT | Mod: S$GLB,,, | Performed by: NURSE PRACTITIONER

## 2021-07-22 PROCEDURE — 99499 DOT PHYSICAL: ICD-10-PCS | Mod: S$GLB,,, | Performed by: NURSE PRACTITIONER

## 2021-10-04 ENCOUNTER — PATIENT MESSAGE (OUTPATIENT)
Dept: ADMINISTRATIVE | Facility: HOSPITAL | Age: 61
End: 2021-10-04

## 2021-11-22 ENCOUNTER — TELEPHONE (OUTPATIENT)
Dept: INTERNAL MEDICINE | Facility: CLINIC | Age: 61
End: 2021-11-22
Payer: COMMERCIAL

## 2021-11-22 ENCOUNTER — OFFICE VISIT (OUTPATIENT)
Dept: FAMILY MEDICINE | Facility: CLINIC | Age: 61
End: 2021-11-22
Payer: COMMERCIAL

## 2021-11-22 VITALS
WEIGHT: 202.81 LBS | OXYGEN SATURATION: 99 % | SYSTOLIC BLOOD PRESSURE: 120 MMHG | DIASTOLIC BLOOD PRESSURE: 60 MMHG | HEIGHT: 69 IN | BODY MASS INDEX: 30.04 KG/M2 | HEART RATE: 71 BPM

## 2021-11-22 DIAGNOSIS — R10.32 LEFT INGUINAL PAIN: ICD-10-CM

## 2021-11-22 DIAGNOSIS — Z12.39 ENCOUNTER FOR SCREENING FOR MALIGNANT NEOPLASM OF BREAST, UNSPECIFIED SCREENING MODALITY: ICD-10-CM

## 2021-11-22 DIAGNOSIS — M79.641 BILATERAL HAND PAIN: ICD-10-CM

## 2021-11-22 DIAGNOSIS — Z11.4 SCREENING FOR HIV (HUMAN IMMUNODEFICIENCY VIRUS): ICD-10-CM

## 2021-11-22 DIAGNOSIS — M79.642 BILATERAL HAND PAIN: ICD-10-CM

## 2021-11-22 DIAGNOSIS — R59.0 INGUINAL ADENOPATHY: Primary | ICD-10-CM

## 2021-11-22 DIAGNOSIS — Z01.419 WELL WOMAN EXAM: ICD-10-CM

## 2021-11-22 PROCEDURE — 99999 PR PBB SHADOW E&M-EST. PATIENT-LVL IV: CPT | Mod: PBBFAC,,, | Performed by: NURSE PRACTITIONER

## 2021-11-22 PROCEDURE — 96372 PR INJECTION,THERAP/PROPH/DIAG2ST, IM OR SUBCUT: ICD-10-PCS | Mod: S$GLB,,, | Performed by: NURSE PRACTITIONER

## 2021-11-22 PROCEDURE — 96372 THER/PROPH/DIAG INJ SC/IM: CPT | Mod: S$GLB,,, | Performed by: NURSE PRACTITIONER

## 2021-11-22 PROCEDURE — 99999 PR PBB SHADOW E&M-EST. PATIENT-LVL IV: ICD-10-PCS | Mod: PBBFAC,,, | Performed by: NURSE PRACTITIONER

## 2021-11-22 PROCEDURE — 99214 PR OFFICE/OUTPT VISIT, EST, LEVL IV, 30-39 MIN: ICD-10-PCS | Mod: 25,S$GLB,, | Performed by: NURSE PRACTITIONER

## 2021-11-22 PROCEDURE — 99214 OFFICE O/P EST MOD 30 MIN: CPT | Mod: 25,S$GLB,, | Performed by: NURSE PRACTITIONER

## 2021-11-22 RX ORDER — KETOROLAC TROMETHAMINE 30 MG/ML
30 INJECTION, SOLUTION INTRAMUSCULAR; INTRAVENOUS
Status: COMPLETED | OUTPATIENT
Start: 2021-11-22 | End: 2021-11-22

## 2021-11-22 RX ORDER — IBUPROFEN 800 MG/1
800 TABLET ORAL 3 TIMES DAILY PRN
Qty: 30 TABLET | Refills: 0 | Status: SHIPPED | OUTPATIENT
Start: 2021-11-22

## 2021-11-22 RX ADMIN — KETOROLAC TROMETHAMINE 30 MG: 30 INJECTION, SOLUTION INTRAMUSCULAR; INTRAVENOUS at 04:11

## 2021-11-23 ENCOUNTER — TELEPHONE (OUTPATIENT)
Dept: FAMILY MEDICINE | Facility: CLINIC | Age: 61
End: 2021-11-23
Payer: COMMERCIAL

## 2021-11-24 DIAGNOSIS — R76.8 POSITIVE ANA (ANTINUCLEAR ANTIBODY): Primary | ICD-10-CM

## 2021-11-29 ENCOUNTER — PATIENT OUTREACH (OUTPATIENT)
Dept: ADMINISTRATIVE | Facility: OTHER | Age: 61
End: 2021-11-29
Payer: COMMERCIAL

## 2021-11-30 ENCOUNTER — OFFICE VISIT (OUTPATIENT)
Dept: OBSTETRICS AND GYNECOLOGY | Facility: CLINIC | Age: 61
End: 2021-11-30
Payer: COMMERCIAL

## 2021-11-30 VITALS — WEIGHT: 201.75 LBS | BODY MASS INDEX: 29.79 KG/M2

## 2021-11-30 DIAGNOSIS — R59.0 INGUINAL ADENOPATHY: ICD-10-CM

## 2021-11-30 DIAGNOSIS — Z01.419 WELL WOMAN EXAM: ICD-10-CM

## 2021-11-30 PROCEDURE — 87591 N.GONORRHOEAE DNA AMP PROB: CPT | Mod: 59 | Performed by: STUDENT IN AN ORGANIZED HEALTH CARE EDUCATION/TRAINING PROGRAM

## 2021-11-30 PROCEDURE — 87491 CHLMYD TRACH DNA AMP PROBE: CPT | Mod: 59 | Performed by: STUDENT IN AN ORGANIZED HEALTH CARE EDUCATION/TRAINING PROGRAM

## 2021-11-30 PROCEDURE — 99386 PREV VISIT NEW AGE 40-64: CPT | Mod: S$GLB,,, | Performed by: STUDENT IN AN ORGANIZED HEALTH CARE EDUCATION/TRAINING PROGRAM

## 2021-11-30 PROCEDURE — 99999 PR PBB SHADOW E&M-EST. PATIENT-LVL II: CPT | Mod: PBBFAC,,, | Performed by: STUDENT IN AN ORGANIZED HEALTH CARE EDUCATION/TRAINING PROGRAM

## 2021-11-30 PROCEDURE — 99386 PR PREVENTIVE VISIT,NEW,40-64: ICD-10-PCS | Mod: S$GLB,,, | Performed by: STUDENT IN AN ORGANIZED HEALTH CARE EDUCATION/TRAINING PROGRAM

## 2021-11-30 PROCEDURE — 99999 PR PBB SHADOW E&M-EST. PATIENT-LVL II: ICD-10-PCS | Mod: PBBFAC,,, | Performed by: STUDENT IN AN ORGANIZED HEALTH CARE EDUCATION/TRAINING PROGRAM

## 2021-11-30 PROCEDURE — 87481 CANDIDA DNA AMP PROBE: CPT | Mod: 59 | Performed by: STUDENT IN AN ORGANIZED HEALTH CARE EDUCATION/TRAINING PROGRAM

## 2021-12-03 ENCOUNTER — OFFICE VISIT (OUTPATIENT)
Dept: RHEUMATOLOGY | Facility: CLINIC | Age: 61
End: 2021-12-03
Payer: COMMERCIAL

## 2021-12-03 ENCOUNTER — HOSPITAL ENCOUNTER (OUTPATIENT)
Dept: RADIOLOGY | Facility: HOSPITAL | Age: 61
Discharge: HOME | End: 2021-12-03
Attending: INTERNAL MEDICINE
Payer: COMMERCIAL

## 2021-12-03 VITALS
DIASTOLIC BLOOD PRESSURE: 87 MMHG | HEIGHT: 69 IN | BODY MASS INDEX: 30.23 KG/M2 | WEIGHT: 204.13 LBS | HEART RATE: 49 BPM | SYSTOLIC BLOOD PRESSURE: 139 MMHG

## 2021-12-03 DIAGNOSIS — M25.50 POLYARTHRALGIA: Primary | ICD-10-CM

## 2021-12-03 DIAGNOSIS — M25.50 POLYARTHRALGIA: ICD-10-CM

## 2021-12-03 DIAGNOSIS — R76.8 POSITIVE ANA (ANTINUCLEAR ANTIBODY): ICD-10-CM

## 2021-12-03 PROCEDURE — 77077 JOINT SURVEY SINGLE VIEW: CPT | Mod: TC

## 2021-12-03 PROCEDURE — 99999 PR PBB SHADOW E&M-EST. PATIENT-LVL IV: CPT | Mod: PBBFAC,,, | Performed by: INTERNAL MEDICINE

## 2021-12-03 PROCEDURE — 77077 XR ARTHRITIS SURVEY: ICD-10-PCS | Mod: 26,,, | Performed by: RADIOLOGY

## 2021-12-03 PROCEDURE — 99999 PR PBB SHADOW E&M-EST. PATIENT-LVL IV: ICD-10-PCS | Mod: PBBFAC,,, | Performed by: INTERNAL MEDICINE

## 2021-12-03 PROCEDURE — 99205 PR OFFICE/OUTPT VISIT, NEW, LEVL V, 60-74 MIN: ICD-10-PCS | Mod: S$GLB,,, | Performed by: INTERNAL MEDICINE

## 2021-12-03 PROCEDURE — 99205 OFFICE O/P NEW HI 60 MIN: CPT | Mod: S$GLB,,, | Performed by: INTERNAL MEDICINE

## 2021-12-03 PROCEDURE — 77077 JOINT SURVEY SINGLE VIEW: CPT | Mod: 26,,, | Performed by: RADIOLOGY

## 2021-12-03 PROCEDURE — 73521 XR HIPS BILATERAL 2 VIEW INCL AP PELVIS: ICD-10-PCS | Mod: 26,,, | Performed by: RADIOLOGY

## 2021-12-03 PROCEDURE — 73521 X-RAY EXAM HIPS BI 2 VIEWS: CPT | Mod: TC

## 2021-12-03 PROCEDURE — 73521 X-RAY EXAM HIPS BI 2 VIEWS: CPT | Mod: 26,,, | Performed by: RADIOLOGY

## 2021-12-03 ASSESSMENT — ROUTINE ASSESSMENT OF PATIENT INDEX DATA (RAPID3)
PSYCHOLOGICAL DISTRESS SCORE: 2.2
PATIENT GLOBAL ASSESSMENT SCORE: 2.5
AM STIFFNESS SCORE: 1, YES
TOTAL RAPID3 SCORE: 2.11
PAIN SCORE: 2.5
MDHAQ FUNCTION SCORE: 0.4
FATIGUE SCORE: 3.5

## 2021-12-04 LAB
C TRACH DNA SPEC QL NAA+PROBE: NOT DETECTED
N GONORRHOEA DNA SPEC QL NAA+PROBE: NOT DETECTED

## 2021-12-07 LAB
BACTERIAL VAGINOSIS DNA: NEGATIVE
CANDIDA GLABRATA DNA: NEGATIVE
CANDIDA KRUSEI DNA: NEGATIVE
CANDIDA RRNA VAG QL PROBE: NEGATIVE
T VAGINALIS RRNA GENITAL QL PROBE: NEGATIVE

## 2021-12-13 ENCOUNTER — PATIENT MESSAGE (OUTPATIENT)
Dept: RHEUMATOLOGY | Facility: CLINIC | Age: 61
End: 2021-12-13
Payer: COMMERCIAL

## 2021-12-16 ENCOUNTER — PATIENT MESSAGE (OUTPATIENT)
Dept: ADMINISTRATIVE | Facility: HOSPITAL | Age: 61
End: 2021-12-16
Payer: COMMERCIAL

## 2021-12-21 ENCOUNTER — HOSPITAL ENCOUNTER (OUTPATIENT)
Dept: RADIOLOGY | Facility: HOSPITAL | Age: 61
Discharge: HOME | End: 2021-12-21
Attending: NURSE PRACTITIONER
Payer: COMMERCIAL

## 2021-12-21 DIAGNOSIS — Z12.39 ENCOUNTER FOR SCREENING FOR MALIGNANT NEOPLASM OF BREAST, UNSPECIFIED SCREENING MODALITY: ICD-10-CM

## 2021-12-21 PROCEDURE — 77067 MAMMO DIGITAL SCREENING BILAT WITH TOMO: ICD-10-PCS | Mod: 26,,, | Performed by: RADIOLOGY

## 2021-12-21 PROCEDURE — 77063 BREAST TOMOSYNTHESIS BI: CPT | Mod: 26,,, | Performed by: RADIOLOGY

## 2021-12-21 PROCEDURE — 77067 SCR MAMMO BI INCL CAD: CPT | Mod: TC,PO

## 2021-12-21 PROCEDURE — 77067 SCR MAMMO BI INCL CAD: CPT | Mod: 26,,, | Performed by: RADIOLOGY

## 2021-12-21 PROCEDURE — 77063 MAMMO DIGITAL SCREENING BILAT WITH TOMO: ICD-10-PCS | Mod: 26,,, | Performed by: RADIOLOGY

## 2022-02-01 ENCOUNTER — LAB VISIT (OUTPATIENT)
Dept: PRIMARY CARE CLINIC | Facility: OTHER | Age: 62
End: 2022-02-01
Attending: INTERNAL MEDICINE
Payer: COMMERCIAL

## 2022-02-01 DIAGNOSIS — U07.1 COVID-19: Primary | ICD-10-CM

## 2022-02-01 LAB
CTP QC/QA: YES
SARS-COV-2 AG RESP QL IA.RAPID: NEGATIVE

## 2022-02-01 PROCEDURE — 87811 SARS-COV-2 COVID19 W/OPTIC: CPT

## 2022-02-01 NOTE — PROGRESS NOTES
Instructions for Patients with Confirmed or Suspected COVID-19    If you are awaiting your test result, you will either be called or it will be released to the patient portal.  If you have any questions about your test, please visit [email or web address] or call our COVID-19 information line at [phone].      Please isolate yourself at home.  You may leave home and/or return to work once the following conditions are met:    If you have symptoms and tested positive:   More than 5 days since symptoms first appeared AND   More than 24 hours fever free without medications AND       symptoms have improved   · For five days after ending isolation, masks are required.    If you had no symptoms but tested positive:   More than 5 days since the date of the first positive test. If you develop symptoms, then use the guidelines above  · For five days after ending isolation, masks are required.      Testing is not recommended if you are symptom free after completing isolation.

## 2022-07-12 ENCOUNTER — PATIENT MESSAGE (OUTPATIENT)
Dept: RHEUMATOLOGY | Facility: CLINIC | Age: 62
End: 2022-07-12
Payer: COMMERCIAL

## 2022-07-12 ENCOUNTER — TELEPHONE (OUTPATIENT)
Dept: RHEUMATOLOGY | Facility: CLINIC | Age: 62
End: 2022-07-12
Payer: COMMERCIAL

## 2022-07-12 NOTE — TELEPHONE ENCOUNTER
Left voicemail regarding patient appointment for August 8, letting patient know Dr. Tao will be on Hospital consults and the appointment has to be canceled, well reach back out to reschedule the appointment.

## 2023-01-11 DIAGNOSIS — Z12.31 OTHER SCREENING MAMMOGRAM: ICD-10-CM

## 2023-03-13 ENCOUNTER — HOSPITAL ENCOUNTER (OUTPATIENT)
Dept: RADIOLOGY | Facility: HOSPITAL | Age: 63
Discharge: HOME | End: 2023-03-13
Attending: INTERNAL MEDICINE
Payer: COMMERCIAL

## 2023-03-13 VITALS — WEIGHT: 183 LBS | BODY MASS INDEX: 27.11 KG/M2 | HEIGHT: 69 IN

## 2023-03-13 DIAGNOSIS — Z12.31 OTHER SCREENING MAMMOGRAM: ICD-10-CM

## 2023-03-13 PROCEDURE — 77063 BREAST TOMOSYNTHESIS BI: CPT | Mod: 26,,, | Performed by: RADIOLOGY

## 2023-03-13 PROCEDURE — 77063 MAMMO DIGITAL SCREENING BILAT WITH TOMO: ICD-10-PCS | Mod: 26,,, | Performed by: RADIOLOGY

## 2023-03-13 PROCEDURE — 77067 SCR MAMMO BI INCL CAD: CPT | Mod: TC,PO

## 2023-03-13 PROCEDURE — 77067 SCR MAMMO BI INCL CAD: CPT | Mod: 26,,, | Performed by: RADIOLOGY

## 2023-03-13 PROCEDURE — 77067 MAMMO DIGITAL SCREENING BILAT WITH TOMO: ICD-10-PCS | Mod: 26,,, | Performed by: RADIOLOGY

## 2023-06-29 ENCOUNTER — OCCUPATIONAL HEALTH (OUTPATIENT)
Dept: URGENT CARE | Facility: CLINIC | Age: 63
End: 2023-06-29

## 2023-06-29 DIAGNOSIS — Z02.89 ENCOUNTER FOR EXAMINATION REQUIRED BY DEPARTMENT OF TRANSPORTATION (DOT): Primary | ICD-10-CM

## 2023-06-29 PROCEDURE — 99499 PHYSICAL, RECERT DOT/CDL: ICD-10-PCS | Mod: S$GLB,,, | Performed by: PHYSICIAN ASSISTANT

## 2023-06-29 PROCEDURE — 99499 UNLISTED E&M SERVICE: CPT | Mod: S$GLB,,, | Performed by: PHYSICIAN ASSISTANT

## 2023-08-10 ENCOUNTER — OFFICE VISIT (OUTPATIENT)
Dept: FAMILY MEDICINE | Facility: CLINIC | Age: 63
End: 2023-08-10
Payer: COMMERCIAL

## 2023-08-10 DIAGNOSIS — B02.9 HERPES ZOSTER WITHOUT COMPLICATION: Primary | ICD-10-CM

## 2023-08-10 DIAGNOSIS — R21 RASH: ICD-10-CM

## 2023-08-10 PROCEDURE — 1159F PR MEDICATION LIST DOCUMENTED IN MEDICAL RECORD: ICD-10-PCS | Mod: CPTII,95,, | Performed by: NURSE PRACTITIONER

## 2023-08-10 PROCEDURE — 99214 PR OFFICE/OUTPT VISIT, EST, LEVL IV, 30-39 MIN: ICD-10-PCS | Mod: 95,,, | Performed by: NURSE PRACTITIONER

## 2023-08-10 PROCEDURE — 1160F RVW MEDS BY RX/DR IN RCRD: CPT | Mod: CPTII,95,, | Performed by: NURSE PRACTITIONER

## 2023-08-10 PROCEDURE — 1159F MED LIST DOCD IN RCRD: CPT | Mod: CPTII,95,, | Performed by: NURSE PRACTITIONER

## 2023-08-10 PROCEDURE — 99214 OFFICE O/P EST MOD 30 MIN: CPT | Mod: 95,,, | Performed by: NURSE PRACTITIONER

## 2023-08-10 PROCEDURE — 1160F PR REVIEW ALL MEDS BY PRESCRIBER/CLIN PHARMACIST DOCUMENTED: ICD-10-PCS | Mod: CPTII,95,, | Performed by: NURSE PRACTITIONER

## 2023-08-10 RX ORDER — VALACYCLOVIR HYDROCHLORIDE 1 G/1
1000 TABLET, FILM COATED ORAL 3 TIMES DAILY
Qty: 21 TABLET | Refills: 0 | Status: SHIPPED | OUTPATIENT
Start: 2023-08-10 | End: 2023-08-17

## 2023-08-10 NOTE — PROGRESS NOTES
"Subjective     Patient ID: Aminta Barajas is a 63 y.o. female.    Chief Complaint: Rash    The patient location is: [place]  The chief complaint leading to consultation is: rash/back pain  Visit type: audiovisual  Face to Face time with patient: 22 minutes  30 minutes of total time spent on the encounter, which includes face to face time and non-face to face time preparing to see the patient (eg, review of tests), Obtaining and/or reviewing separately obtained history, Documenting clinical information in the electronic or other health record, Independently interpreting results (not separately reported) and communicating results to the patient/family/caregiver, or Care coordination (not separately reported).     Each patient to whom he or she provides medical services by telemedicine is:  (1) informed of the relationship between the physician and patient and the respective role of any other health care provider with respect to management of the patient; and (2) notified that he or she may decline to receive medical services by telemedicine and may withdraw from such care at any time.    Notes: This is a pleasant 62 yo female patient of Dr. Grijalva who is known to me. She presents today via [place] Virtual visit with c/o rash and back pain. PMH includes    Patient Active Problem List:     Impaired mobility and activities of daily living    Reports she started experiencing sharp right sided back pain on 7/28. Went to ED on 7/30 when pain did not resolve; had imaging and EKG done that appeared to be stable. Given Toradol injection and d/c'd with Celebrex and Norco which did offer pain relief. Started noticing a rash to her back on 8/2; describes as itching, burning, "stabbing pains". Did notice a new lesion develop in her right upper abdominal area as of 2 days ago. No known exposure to Shingles; admits to dealing with a lot of personal stress at this time. Has not received shingles vaccine. Denies fever, CP, SOB, " URI symptoms. See more below.    Back Pain  This is a new problem. The current episode started in the past 7 days. The problem occurs constantly. The problem has been rapidly worsening since onset. The pain is present in the lumbar spine. The quality of the pain is described as shooting. The pain is at a severity of 9/10. The pain is severe. The pain is The same all the time. The symptoms are aggravated by lying down and sitting. Stiffness is present All day. Associated symptoms include numbness, paresthesias and tingling. Pertinent negatives include no abdominal pain, bladder incontinence, bowel incontinence, chest pain, dysuria, fever, headaches, leg pain, paresis, pelvic pain, perianal numbness, weakness or weight loss. The treatment provided no relief.     Review of Systems   Constitutional:  Negative for fever and weight loss.   Cardiovascular:  Negative for chest pain.   Gastrointestinal:  Negative for abdominal pain and bowel incontinence.   Genitourinary:  Negative for bladder incontinence, dysuria, hematuria and pelvic pain.   Musculoskeletal:  Positive for back pain. Negative for leg pain.   Integumentary:  Positive for rash.   Neurological:  Positive for tingling, numbness and paresthesias. Negative for weakness and headaches.          Objective     Physical Exam  Constitutional:       General: She is not in acute distress.     Appearance: Normal appearance. She is well-developed and well-groomed.   HENT:      Head: Normocephalic.   Pulmonary:      Effort: Pulmonary effort is normal. No respiratory distress.   Skin:     Findings: Rash present. Rash is urticarial and vesicular.             Comments: Rash noted to area outlined in red, see attached photo  New lesion as of 2 days ago marked in purple   Neurological:      Mental Status: She is alert and oriented to person, place, and time.   Psychiatric:         Attention and Perception: Attention normal.         Mood and Affect: Mood and affect normal.          Speech: Speech normal.         Behavior: Behavior normal. Behavior is cooperative.         Physical exam limited d/t virtual visit. Patient does not appear to be in any respiratory distress. Able to speak in complete sentences without becoming short of breath.     Assessment and Plan     1. Herpes zoster without complication  -     valACYclovir (VALTREX) 1000 MG tablet; Take 1 tablet (1,000 mg total) by mouth 3 (three) times daily. for 7 days  Dispense: 21 tablet; Refill: 0    2. Rash      - Begin antiviral TID x 7 days  - May use OTC pain relievers as directed  - Cool compress may offer relief  - Avoid hot showers; wear loose clothing, avoid wearing a bra at this time  - Warning signs discussed  - Strict handwashing; informed shingles is considered contagious  - Work excuse provided       Follow up if symptoms worsen or fail to improve.            I spent a total of 30 minutes on the day of the visit.  This includes face to face time and non-face to face time preparing to see the patient (eg, review of tests), obtaining and/or reviewing separately obtained history, documenting clinical information in the electronic or other health record, independently interpreting results and communicating results to the patient/family/caregiver, or care coordinator.

## 2023-08-10 NOTE — LETTER
August 10, 2023      [facility]  [address]  [place] [address]  Phone: [phone]  Fax: [phone]       Patient: Aminta Barajas   YOB: 1960  Date of Visit: 08/10/2023    To Whom It May Concern:    Jakub Barajas  was seen by me at [facility] on 08/10/2023. The patient may return to work on 8/14/2023 with no restrictions. If you have any questions or concerns, or if I can be of further assistance, please do not hesitate to contact me.    Sincerely,        Shantel Robison NP

## 2023-11-28 ENCOUNTER — NURSE TRIAGE (OUTPATIENT)
Dept: ADMINISTRATIVE | Facility: CLINIC | Age: 63
End: 2023-11-28
Payer: COMMERCIAL

## 2023-11-28 NOTE — TELEPHONE ENCOUNTER
Pt called with c/o flu symptoms; HA, body ache, coughing, sneezing, at home covid test negative, unsure if she has fever. Pt reports she is unable to tough her chin to her chest as well as a bad headache.    Per care advice advised pt to go to ED now to be seen. Pt verbalizes understanding.      Reason for Disposition   Headache and stiff neck (can't touch chin to chest)    Additional Information   Negative: SEVERE difficulty breathing (e.g., struggling for each breath, speaks in single words)   Negative: Bluish (or gray) lips or face   Negative: Shock suspected (e.g., cold/pale/clammy skin, too weak to stand, low BP, rapid pulse)   Negative: Sounds like a life-threatening emergency to the triager    Protocols used: Influenza (Flu) - Seasonal-A-OH

## 2024-03-27 DIAGNOSIS — Z12.31 OTHER SCREENING MAMMOGRAM: ICD-10-CM

## 2024-04-26 ENCOUNTER — HOSPITAL ENCOUNTER (OUTPATIENT)
Dept: RADIOLOGY | Facility: HOSPITAL | Age: 64
Discharge: HOME | End: 2024-04-26
Attending: INTERNAL MEDICINE
Payer: COMMERCIAL

## 2024-04-26 VITALS — BODY MASS INDEX: 27.11 KG/M2 | WEIGHT: 183 LBS | HEIGHT: 69 IN

## 2024-04-26 DIAGNOSIS — Z12.31 OTHER SCREENING MAMMOGRAM: ICD-10-CM

## 2024-04-26 PROCEDURE — 77063 BREAST TOMOSYNTHESIS BI: CPT | Mod: 26,,, | Performed by: RADIOLOGY

## 2024-04-26 PROCEDURE — 77067 SCR MAMMO BI INCL CAD: CPT | Mod: 26,,, | Performed by: RADIOLOGY

## 2024-04-26 PROCEDURE — 77063 BREAST TOMOSYNTHESIS BI: CPT | Mod: TC

## 2024-05-29 ENCOUNTER — OCCUPATIONAL HEALTH (OUTPATIENT)
Dept: URGENT CARE | Facility: CLINIC | Age: 64
End: 2024-05-29
Payer: COMMERCIAL

## 2024-05-29 DIAGNOSIS — Z02.89 ENCOUNTER FOR EXAMINATION REQUIRED BY DEPARTMENT OF TRANSPORTATION (DOT): Primary | ICD-10-CM

## 2024-05-29 PROCEDURE — 99499 UNLISTED E&M SERVICE: CPT | Mod: S$GLB,,, | Performed by: STUDENT IN AN ORGANIZED HEALTH CARE EDUCATION/TRAINING PROGRAM

## 2024-06-12 ENCOUNTER — LAB VISIT (OUTPATIENT)
Dept: LAB | Facility: HOSPITAL | Age: 64
End: 2024-06-12
Attending: INTERNAL MEDICINE
Payer: COMMERCIAL

## 2024-06-12 ENCOUNTER — OFFICE VISIT (OUTPATIENT)
Dept: INTERNAL MEDICINE | Facility: CLINIC | Age: 64
End: 2024-06-12
Payer: COMMERCIAL

## 2024-06-12 VITALS
DIASTOLIC BLOOD PRESSURE: 74 MMHG | WEIGHT: 190.5 LBS | SYSTOLIC BLOOD PRESSURE: 110 MMHG | BODY MASS INDEX: 28.22 KG/M2 | OXYGEN SATURATION: 98 % | HEIGHT: 69 IN

## 2024-06-12 DIAGNOSIS — K21.9 GASTROESOPHAGEAL REFLUX DISEASE, UNSPECIFIED WHETHER ESOPHAGITIS PRESENT: ICD-10-CM

## 2024-06-12 DIAGNOSIS — Z00.00 PREVENTATIVE HEALTH CARE: Primary | ICD-10-CM

## 2024-06-12 DIAGNOSIS — R10.9 ABDOMINAL PAIN, UNSPECIFIED ABDOMINAL LOCATION: ICD-10-CM

## 2024-06-12 DIAGNOSIS — H53.8 BLURRED VISION: ICD-10-CM

## 2024-06-12 DIAGNOSIS — Z00.00 PREVENTATIVE HEALTH CARE: ICD-10-CM

## 2024-06-12 DIAGNOSIS — Z23 NEED FOR TDAP VACCINATION: ICD-10-CM

## 2024-06-12 DIAGNOSIS — R07.9 CHEST PAIN, UNSPECIFIED TYPE: ICD-10-CM

## 2024-06-12 PROBLEM — Z78.9 IMPAIRED MOBILITY AND ACTIVITIES OF DAILY LIVING: Status: RESOLVED | Noted: 2020-07-27 | Resolved: 2024-06-12

## 2024-06-12 PROBLEM — Z74.09 IMPAIRED MOBILITY AND ACTIVITIES OF DAILY LIVING: Status: RESOLVED | Noted: 2020-07-27 | Resolved: 2024-06-12

## 2024-06-12 LAB
ALBUMIN SERPL BCP-MCNC: 3.8 G/DL (ref 3.5–5.2)
ALP SERPL-CCNC: 79 U/L (ref 55–135)
ALT SERPL W/O P-5'-P-CCNC: 10 U/L (ref 10–44)
ANION GAP SERPL CALC-SCNC: 8 MMOL/L (ref 8–16)
AST SERPL-CCNC: 19 U/L (ref 10–40)
BASOPHILS # BLD AUTO: 0.02 K/UL (ref 0–0.2)
BASOPHILS NFR BLD: 0.3 % (ref 0–1.9)
BILIRUB SERPL-MCNC: 0.4 MG/DL (ref 0.1–1)
BUN SERPL-MCNC: 7 MG/DL (ref 8–23)
CALCIUM SERPL-MCNC: 9.8 MG/DL (ref 8.7–10.5)
CHLORIDE SERPL-SCNC: 105 MMOL/L (ref 95–110)
CHOLEST SERPL-MCNC: 187 MG/DL (ref 120–199)
CHOLEST/HDLC SERPL: 3.8 {RATIO} (ref 2–5)
CO2 SERPL-SCNC: 25 MMOL/L (ref 23–29)
CREAT SERPL-MCNC: 0.9 MG/DL (ref 0.5–1.4)
DIFFERENTIAL METHOD BLD: NORMAL
EOSINOPHIL # BLD AUTO: 0.1 K/UL (ref 0–0.5)
EOSINOPHIL NFR BLD: 1.9 % (ref 0–8)
ERYTHROCYTE [DISTWIDTH] IN BLOOD BY AUTOMATED COUNT: 12.7 % (ref 11.5–14.5)
EST. GFR  (NO RACE VARIABLE): >60 ML/MIN/1.73 M^2
ESTIMATED AVG GLUCOSE: 103 MG/DL (ref 68–131)
GLUCOSE SERPL-MCNC: 69 MG/DL (ref 70–110)
HBA1C MFR BLD: 5.2 % (ref 4–5.6)
HCT VFR BLD AUTO: 39.3 % (ref 37–48.5)
HDLC SERPL-MCNC: 49 MG/DL (ref 40–75)
HDLC SERPL: 26.2 % (ref 20–50)
HGB BLD-MCNC: 13 G/DL (ref 12–16)
IMM GRANULOCYTES # BLD AUTO: 0.01 K/UL (ref 0–0.04)
IMM GRANULOCYTES NFR BLD AUTO: 0.2 % (ref 0–0.5)
LDLC SERPL CALC-MCNC: 125.8 MG/DL (ref 63–159)
LYMPHOCYTES # BLD AUTO: 2.1 K/UL (ref 1–4.8)
LYMPHOCYTES NFR BLD: 34.8 % (ref 18–48)
MCH RBC QN AUTO: 30.1 PG (ref 27–31)
MCHC RBC AUTO-ENTMCNC: 33.1 G/DL (ref 32–36)
MCV RBC AUTO: 91 FL (ref 82–98)
MONOCYTES # BLD AUTO: 0.4 K/UL (ref 0.3–1)
MONOCYTES NFR BLD: 7.5 % (ref 4–15)
NEUTROPHILS # BLD AUTO: 3.3 K/UL (ref 1.8–7.7)
NEUTROPHILS NFR BLD: 55.3 % (ref 38–73)
NONHDLC SERPL-MCNC: 138 MG/DL
NRBC BLD-RTO: 0 /100 WBC
PLATELET # BLD AUTO: 337 K/UL (ref 150–450)
PMV BLD AUTO: 10.6 FL (ref 9.2–12.9)
POTASSIUM SERPL-SCNC: 3.9 MMOL/L (ref 3.5–5.1)
PROT SERPL-MCNC: 8.1 G/DL (ref 6–8.4)
RBC # BLD AUTO: 4.32 M/UL (ref 4–5.4)
SODIUM SERPL-SCNC: 138 MMOL/L (ref 136–145)
TRIGL SERPL-MCNC: 61 MG/DL (ref 30–150)
TSH SERPL DL<=0.005 MIU/L-ACNC: 3.21 UIU/ML (ref 0.4–4)
WBC # BLD AUTO: 5.89 K/UL (ref 3.9–12.7)

## 2024-06-12 PROCEDURE — 85025 COMPLETE CBC W/AUTO DIFF WBC: CPT | Performed by: INTERNAL MEDICINE

## 2024-06-12 PROCEDURE — 3074F SYST BP LT 130 MM HG: CPT | Mod: CPTII,S$GLB,, | Performed by: INTERNAL MEDICINE

## 2024-06-12 PROCEDURE — 80053 COMPREHEN METABOLIC PANEL: CPT | Performed by: INTERNAL MEDICINE

## 2024-06-12 PROCEDURE — 3008F BODY MASS INDEX DOCD: CPT | Mod: CPTII,S$GLB,, | Performed by: INTERNAL MEDICINE

## 2024-06-12 PROCEDURE — 36415 COLL VENOUS BLD VENIPUNCTURE: CPT | Mod: PO | Performed by: INTERNAL MEDICINE

## 2024-06-12 PROCEDURE — 3044F HG A1C LEVEL LT 7.0%: CPT | Mod: CPTII,S$GLB,, | Performed by: INTERNAL MEDICINE

## 2024-06-12 PROCEDURE — 1159F MED LIST DOCD IN RCRD: CPT | Mod: CPTII,S$GLB,, | Performed by: INTERNAL MEDICINE

## 2024-06-12 PROCEDURE — 84443 ASSAY THYROID STIM HORMONE: CPT | Performed by: INTERNAL MEDICINE

## 2024-06-12 PROCEDURE — 99999 PR PBB SHADOW E&M-EST. PATIENT-LVL III: CPT | Mod: PBBFAC,,, | Performed by: INTERNAL MEDICINE

## 2024-06-12 PROCEDURE — 90715 TDAP VACCINE 7 YRS/> IM: CPT | Mod: S$GLB,,, | Performed by: INTERNAL MEDICINE

## 2024-06-12 PROCEDURE — 99386 PREV VISIT NEW AGE 40-64: CPT | Mod: 25,S$GLB,, | Performed by: INTERNAL MEDICINE

## 2024-06-12 PROCEDURE — 93010 ELECTROCARDIOGRAM REPORT: CPT | Mod: S$GLB,,, | Performed by: INTERNAL MEDICINE

## 2024-06-12 PROCEDURE — 93005 ELECTROCARDIOGRAM TRACING: CPT | Mod: S$GLB,,, | Performed by: INTERNAL MEDICINE

## 2024-06-12 PROCEDURE — 1160F RVW MEDS BY RX/DR IN RCRD: CPT | Mod: CPTII,S$GLB,, | Performed by: INTERNAL MEDICINE

## 2024-06-12 PROCEDURE — 90471 IMMUNIZATION ADMIN: CPT | Mod: S$GLB,,, | Performed by: INTERNAL MEDICINE

## 2024-06-12 PROCEDURE — 83036 HEMOGLOBIN GLYCOSYLATED A1C: CPT | Performed by: INTERNAL MEDICINE

## 2024-06-12 PROCEDURE — 80061 LIPID PANEL: CPT | Performed by: INTERNAL MEDICINE

## 2024-06-12 PROCEDURE — 3078F DIAST BP <80 MM HG: CPT | Mod: CPTII,S$GLB,, | Performed by: INTERNAL MEDICINE

## 2024-06-12 RX ORDER — OMEPRAZOLE 40 MG/1
40 CAPSULE, DELAYED RELEASE ORAL DAILY
Qty: 90 CAPSULE | Refills: 3 | Status: SHIPPED | OUTPATIENT
Start: 2024-06-12 | End: 2025-06-12

## 2024-06-12 NOTE — PROGRESS NOTES
Patient ID: Aminta Barajas is a 64 y.o. female    Chief Complaint: Annual Exam    History of Present Illness  The patient is a 64-year-old female coming in for annual checkup.    The patient did not undergo her blood work prior to her visit. She has not sought regular primary care or internal medicine care for the past 4 years due  no significantto recent health issues.. She does not take any regular medications. She underwent a mammogram in 04/2023. Her last colon cancer screening was conducted in 2018 and is current. Her last women's healthcare visit was conducted in 2018. She expresses a desire for an eye examination due to experiencing cloudiness and blurry vision, which persist until noon. Initially, she attributed these symptoms to hay fever, but this has not occurred in months. She denies experiencing chest pain, chest tightness, or pain during physical exertion. She ceased consumption of meat 5 years ago but continues to consume seafood.    The patient reports experiencing morning abdominal pain for the past 3 to 4 months, which disrupts her sleep. The pain subsides by 10:00 AM or 11:00 AM. She has not attempted any pharmacological treatments such as Tums, Pepcid, or Prilosec. She denies experiencing nausea or vomiting, but suspects acid reflux. She has made dietary modifications and has lost 1 to 2 pounds. She denies any changes in bowel movements or dark stools.    Supplemental Information  She had a panic attack 3 to 4 months ago.   Her mother is 85 years old and has diabetes. She takes care of her elderly mother who has dementia. She has a family history of heart disease. Several people who have had heart attacks.   She got her shingles vaccine in 02/2023 and 05/2023.    ROS: Otherwise Negative     Physical Exam   General: Well-appearing, well-nourished.  No distress  HEENT: conjunctivae are normal.  Pupils are equal and reative to light.  TM's are clear and intact bilaterally.  Hearing is grossly  normal.  Nasopharynx is clear.  Oropharynx is clear.  Neck: Supple.  No thyroid megaly.  No bruits.  Lymph: No cervical or supraclavicular adenopathy.  Heart: Regular rate and rhythm, without murmur, rub or gallop.  Lungs: Clear to auscultation; respiratory effort normal.  Abdomen: Soft,  indicates some pain more in the midepigastric region, nondistended.  Normoactive bowel sounds.  No hepatomegaly.  No masses.  Extremities: Good distal pulses.  No edema.  Psych: Oriented to time person place.  Judgment and insight seem unimpaired.  Mood and affect are appropriate.    Results      Assessment & Plan  1. Annual checkup.  A comprehensive blood work will be ordered to screen for anemia, kidney and liver function, diabetes, thyroid, and cholesterol levels. A Pap smear is also due. An EKG will be performed.    2. Acid reflux.  Omeprazole has been prescribed, to be taken daily in the morning. Dietary modifications will be provided for the ensuing 2 months.      Follow up in about 1 year (around 6/12/2025) for physical cbc tsh cmp lipids +/- Psa.    Aminta was seen today for annual exam.    Diagnoses and all orders for this visit:    Preventative health care  -     CBC Auto Differential; Future  -     Comprehensive Metabolic Panel; Future  -     Lipid Panel; Future  -     TSH; Future  -     H. pylori Antibody, IgG; Future  -     Hemoglobin A1C; Standing  -     CBC Auto Differential; Future  -     Comprehensive Metabolic Panel; Future  -     Lipid Panel; Future  -     TSH; Future    Abdominal pain, unspecified abdominal location  -     H. pylori Antibody, IgG; Future    Gastroesophageal reflux disease, unspecified whether esophagitis present  -     omeprazole (PRILOSEC) 40 MG capsule; Take 1 capsule (40 mg total) by mouth once daily.   Patient not associated with any unintentional weight loss or abnormal bowel movements.  Proceed with H pylori CBC and PPI trial.  Reflux precautions advised.  If symptoms do not improve  recommended she contact me will schedule her for gastroenterology evaluation and EGD.  Blurred vision  -     Ambulatory referral/consult to Optometry; Future    Need for Tdap vaccination  -     (In Office Administered) Tdap Vaccine    Chest pain, unspecified type  -     IN OFFICE EKG 12-LEAD (to Muse)         This note was generated with the assistance of ambient listening technology. Verbal consent was obtained by the patient and accompanying visitor(s) for the recording of patient appointment to facilitate this note. I attest to having reviewed and edited the generated note for accuracy, though some syntax or spelling errors may persist. Please contact the author of this note for any clarification.

## 2024-06-13 LAB
OHS QRS DURATION: 86 MS
OHS QTC CALCULATION: 427 MS

## 2024-06-14 ENCOUNTER — LAB VISIT (OUTPATIENT)
Dept: LAB | Facility: HOSPITAL | Age: 64
End: 2024-06-14
Attending: INTERNAL MEDICINE
Payer: COMMERCIAL

## 2024-06-14 DIAGNOSIS — Z00.00 PREVENTATIVE HEALTH CARE: ICD-10-CM

## 2024-06-14 DIAGNOSIS — R10.9 ABDOMINAL PAIN, UNSPECIFIED ABDOMINAL LOCATION: ICD-10-CM

## 2024-06-14 PROCEDURE — 86677 HELICOBACTER PYLORI ANTIBODY: CPT | Performed by: INTERNAL MEDICINE

## 2024-06-14 PROCEDURE — 36415 COLL VENOUS BLD VENIPUNCTURE: CPT | Mod: PO | Performed by: INTERNAL MEDICINE

## 2024-06-17 LAB — H PYLORI IGG SERPL QL IA: NEGATIVE

## 2025-03-12 ENCOUNTER — PATIENT OUTREACH (OUTPATIENT)
Dept: ADMINISTRATIVE | Facility: HOSPITAL | Age: 65
End: 2025-03-12
Payer: COMMERCIAL

## 2025-03-12 NOTE — PROGRESS NOTES
03/12/2025  VB chart audit performed. Care Everywhere updates requested and reviewed  Overdue HM topic chart audit and/or requested. LINKS triggered and reconciled. Media reviewed Lvm/portal sent regarding overdue health topics